# Patient Record
Sex: FEMALE | Race: WHITE | NOT HISPANIC OR LATINO | Employment: OTHER | URBAN - METROPOLITAN AREA
[De-identification: names, ages, dates, MRNs, and addresses within clinical notes are randomized per-mention and may not be internally consistent; named-entity substitution may affect disease eponyms.]

---

## 2019-04-24 PROBLEM — R60.0 LOCALIZED EDEMA: Status: ACTIVE | Noted: 2019-04-24

## 2019-04-24 PROBLEM — E66.812 CLASS 2 OBESITY WITHOUT SERIOUS COMORBIDITY WITH BODY MASS INDEX (BMI) OF 36.0 TO 36.9 IN ADULT: Status: ACTIVE | Noted: 2019-04-24

## 2019-04-24 PROBLEM — E66.9 CLASS 2 OBESITY WITHOUT SERIOUS COMORBIDITY WITH BODY MASS INDEX (BMI) OF 36.0 TO 36.9 IN ADULT: Status: ACTIVE | Noted: 2019-04-24

## 2019-04-24 PROBLEM — F17.210 DEPENDENCE ON NICOTINE FROM CIGARETTES: Status: ACTIVE | Noted: 2019-04-24

## 2019-04-24 PROBLEM — H10.13 ALLERGIC CONJUNCTIVITIS OF BOTH EYES: Status: ACTIVE | Noted: 2019-04-24

## 2020-06-02 PROBLEM — F17.210 DEPENDENCE ON NICOTINE FROM CIGARETTES: Status: RESOLVED | Noted: 2019-04-24 | Resolved: 2020-06-02

## 2021-07-16 PROBLEM — Z91.89 FRAMINGHAM CARDIAC RISK 10-20% IN NEXT 10 YEARS: Status: ACTIVE | Noted: 2021-07-16

## 2021-07-16 PROBLEM — Z12.31 ENCOUNTER FOR SCREENING MAMMOGRAM FOR MALIGNANT NEOPLASM OF BREAST: Status: ACTIVE | Noted: 2021-07-16

## 2021-07-16 PROBLEM — E66.01 CLASS 2 SEVERE OBESITY DUE TO EXCESS CALORIES WITH SERIOUS COMORBIDITY AND BODY MASS INDEX (BMI) OF 36.0 TO 36.9 IN ADULT: Status: ACTIVE | Noted: 2019-04-24

## 2021-09-27 ENCOUNTER — IMMUNIZATION (OUTPATIENT)
Dept: FAMILY MEDICINE | Facility: CLINIC | Age: 76
End: 2021-09-27

## 2021-09-27 DIAGNOSIS — Z23 NEED FOR VACCINATION: Primary | ICD-10-CM

## 2021-09-27 PROCEDURE — 0003A COVID-19, MRNA, LNP-S, PF, 30 MCG/0.3 ML DOSE VACCINE: CPT | Mod: PBBFAC,PO

## 2021-09-27 PROCEDURE — 91300 COVID-19, MRNA, LNP-S, PF, 30 MCG/0.3 ML DOSE VACCINE: CPT | Mod: PBBFAC,PO

## 2022-11-11 PROBLEM — M72.2 PLANTAR FASCIITIS: Status: ACTIVE | Noted: 2022-11-11

## 2022-11-11 PROBLEM — E78.2 MIXED HYPERLIPIDEMIA: Status: ACTIVE | Noted: 2022-11-11

## 2023-05-08 ENCOUNTER — TELEPHONE (OUTPATIENT)
Dept: PODIATRY | Facility: CLINIC | Age: 78
End: 2023-05-08
Payer: MEDICARE

## 2023-05-08 NOTE — TELEPHONE ENCOUNTER
----- Message from Amos Del Castillo sent at 5/8/2023  1:25 PM CDT -----  Contact: juan [phone]  Type: Needs Medical Advice  Who Called:  Juan Sandoval Call Back Number: [phone] or [phone]    Additional Information: Juan is calling the office trying to see why pt appt was cancelled. Please call back and advise.   URGENT

## 2023-05-11 ENCOUNTER — TELEPHONE (OUTPATIENT)
Dept: PODIATRY | Facility: CLINIC | Age: 78
End: 2023-05-11
Payer: MEDICARE

## 2023-05-11 NOTE — TELEPHONE ENCOUNTER
Spoke with patient and rescheduled appointment due to emergent surgery. She voiced understanding about rescheduling and new appointment time and date.

## 2023-05-16 ENCOUNTER — OFFICE VISIT (OUTPATIENT)
Dept: PODIATRY | Facility: CLINIC | Age: 78
End: 2023-05-16
Payer: MEDICARE

## 2023-05-16 VITALS — HEIGHT: 64 IN | BODY MASS INDEX: 36.05 KG/M2 | WEIGHT: 211.19 LBS

## 2023-05-16 DIAGNOSIS — L84 CORN OR CALLUS: ICD-10-CM

## 2023-05-16 DIAGNOSIS — M79.672 FOOT PAIN, BILATERAL: Primary | ICD-10-CM

## 2023-05-16 DIAGNOSIS — M20.41 HAMMER TOES OF BOTH FEET: ICD-10-CM

## 2023-05-16 DIAGNOSIS — Q82.8 POROKERATOSIS: ICD-10-CM

## 2023-05-16 DIAGNOSIS — M79.671 FOOT PAIN, BILATERAL: Primary | ICD-10-CM

## 2023-05-16 DIAGNOSIS — M20.42 HAMMER TOES OF BOTH FEET: ICD-10-CM

## 2023-05-16 PROCEDURE — 1125F PR PAIN SEVERITY QUANTIFIED, PAIN PRESENT: ICD-10-PCS | Mod: CPTII,,, | Performed by: PODIATRIST

## 2023-05-16 PROCEDURE — 1159F PR MEDICATION LIST DOCUMENTED IN MEDICAL RECORD: ICD-10-PCS | Mod: CPTII,,, | Performed by: PODIATRIST

## 2023-05-16 PROCEDURE — 99999 PR PBB SHADOW E&M-EST. PATIENT-LVL II: CPT | Mod: PBBFAC,,, | Performed by: PODIATRIST

## 2023-05-16 PROCEDURE — 99999 PR PBB SHADOW E&M-EST. PATIENT-LVL II: ICD-10-PCS | Mod: PBBFAC,,, | Performed by: PODIATRIST

## 2023-05-16 PROCEDURE — 1159F MED LIST DOCD IN RCRD: CPT | Mod: CPTII,,, | Performed by: PODIATRIST

## 2023-05-16 PROCEDURE — 1101F PR PT FALLS ASSESS DOC 0-1 FALLS W/OUT INJ PAST YR: ICD-10-PCS | Mod: CPTII,,, | Performed by: PODIATRIST

## 2023-05-16 PROCEDURE — 99203 OFFICE O/P NEW LOW 30 MIN: CPT | Mod: ,,, | Performed by: PODIATRIST

## 2023-05-16 PROCEDURE — 3288F PR FALLS RISK ASSESSMENT DOCUMENTED: ICD-10-PCS | Mod: CPTII,,, | Performed by: PODIATRIST

## 2023-05-16 PROCEDURE — 3288F FALL RISK ASSESSMENT DOCD: CPT | Mod: CPTII,,, | Performed by: PODIATRIST

## 2023-05-16 PROCEDURE — 99203 PR OFFICE/OUTPT VISIT, NEW, LEVL III, 30-44 MIN: ICD-10-PCS | Mod: ,,, | Performed by: PODIATRIST

## 2023-05-16 PROCEDURE — 1125F AMNT PAIN NOTED PAIN PRSNT: CPT | Mod: CPTII,,, | Performed by: PODIATRIST

## 2023-05-16 PROCEDURE — 1101F PT FALLS ASSESS-DOCD LE1/YR: CPT | Mod: CPTII,,, | Performed by: PODIATRIST

## 2023-05-16 NOTE — PROGRESS NOTES
Subjective:     Patient ID: Maci Moses is a 77 y.o. female.    Chief Complaint: Callouses (Callous corn) and Foot Problem (inserts)    Maci is a 77 y.o. female who presents with the chief complaint of bilateral foot pain.  Describes sharp pain, 9/10, from the site of callus build up to bilateral foot.  States the lesions have been present for years.  The lesion to the Rt. Plantar heel is especially tender to touch.  She has attempted to wear custom orthotics to eliminate symptoms with no relief noted.  Denies recent trauma to the limbs.  Denies any additional pedal complaints.      No past medical history on file.    Past Surgical History:   Procedure Laterality Date    HYSTERECTOMY      partial @ age 35     NOSE SURGERY      WRIST SURGERY         No family history on file.    Social History     Socioeconomic History    Marital status:    Tobacco Use    Smoking status: Former     Types: Cigarettes     Quit date: 2/2/2020     Years since quitting: 3.2    Smokeless tobacco: Never   Substance and Sexual Activity    Alcohol use: No    Drug use: No    Sexual activity: Never       Current Outpatient Medications   Medication Sig Dispense Refill    aspirin 162.5 mg Cp24 ASPIRIN 81 MG TABS      furosemide (LASIX) 20 MG tablet TAKE 1 TABLET EVERY DAY 90 tablet 0    potassium chloride SA (K-DUR,KLOR-CON) 20 MEQ tablet Take 1 tablet (20 mEq total) by mouth once daily. 90 tablet 1    rosuvastatin (CRESTOR) 10 MG tablet Take 1 tablet (10 mg total) by mouth once daily. 90 tablet 3     No current facility-administered medications for this visit.       Review of patient's allergies indicates:  No Known Allergies     Review of Systems   Constitutional: Negative for chills and fever.   Cardiovascular:  Positive for leg swelling. Negative for claudication.   Skin:  Positive for suspicious lesions. Negative for color change and nail changes.   Musculoskeletal:  Negative for muscle cramps and muscle weakness.    Gastrointestinal:  Negative for nausea and vomiting.   Neurological:  Negative for numbness and paresthesias.      Objective:     Physical Exam  Constitutional:       Appearance: Normal appearance. She is not ill-appearing.   Cardiovascular:      Pulses:           Dorsalis pedis pulses are 2+ on the right side and 2+ on the left side.        Posterior tibial pulses are 2+ on the right side and 2+ on the left side.      Comments: CFT is < 3 seconds bilateral.  Pedal hair growth is present bilateral.  Moderate nonpitting lower extremity edema noted bilateral.  Toes are warm to touch bilateral.    Musculoskeletal:         General: Tenderness and deformity present. No signs of injury.      Right lower leg: No edema.      Left lower leg: No edema.      Comments: Muscle strength 5/5 in all muscle groups bilateral.  No tenderness nor crepitation with ROM of foot/ankle joints bilateral.  Semi-reducible contracture of toes 2-5 bilateral.  Pain with palpation to the lesion of the Lt. 3rd toe and Rt. 2nd toe.     Skin:     General: Skin is warm.      Capillary Refill: Capillary refill takes 2 to 3 seconds.      Findings: Lesion present. No bruising, erythema, laceration, petechiae, rash or wound.      Comments: Pedal skin has normal turgor, temperature, and texture bilateral.  Toenails x 10 appear normotrophic. Porokeratosis noted to the Rt. Plantar central heel.  Corn noted to the distal tip of the Lt. 3rd toe and Rt. 2nd toe.        Neurological:      General: No focal deficit present.      Mental Status: She is alert.      Sensory: No sensory deficit.      Motor: No weakness or atrophy.      Comments: Light touch is intact bilateral.           Assessment:      Encounter Diagnoses   Name Primary?    Foot pain, bilateral Yes    Porokeratosis     Corn or callus     Hammer toes of both feet      Plan:     Maci was seen today for callouses and foot problem.    Diagnoses and all orders for this visit:    Foot pain,  bilateral    Porokeratosis    Corn or callus    Hammer toes of both feet      I counseled the patient on her conditions, their implications and medical management.    With the patient's verbal consent, a sterile #15 scalpel was used to trim lesions x 3 down to smooth appearing skin without incident.  Patient tolerated this quite well.    Fitted and dispensed a crest pad to offload the hammertoes of bilateral foot.    Advise to ambulate only in the [place]oka shoes and with insoles to accommodate her flatfoot deformity.    Advised to apply Ebanel and an occlusive dressing to the porokeratosis of the Rt. Plantar heel once daily x 3 weeks.    RTC in 3 weeks for a follow up.    Lucien Diaz DPM

## 2023-06-08 ENCOUNTER — OFFICE VISIT (OUTPATIENT)
Dept: ORTHOPEDICS | Facility: CLINIC | Age: 78
End: 2023-06-08
Payer: MEDICARE

## 2023-06-08 ENCOUNTER — HOSPITAL ENCOUNTER (OUTPATIENT)
Dept: RADIOLOGY | Facility: HOSPITAL | Age: 78
Discharge: HOME | End: 2023-06-08
Attending: ORTHOPAEDIC SURGERY
Payer: MEDICARE

## 2023-06-08 DIAGNOSIS — M79.672 PAIN IN BOTH FEET: ICD-10-CM

## 2023-06-08 DIAGNOSIS — M79.671 PAIN IN BOTH FEET: Primary | ICD-10-CM

## 2023-06-08 DIAGNOSIS — M21.41 ACQUIRED PES PLANOVALGUS OF RIGHT FOOT: Primary | ICD-10-CM

## 2023-06-08 DIAGNOSIS — M79.671 PAIN IN BOTH FEET: ICD-10-CM

## 2023-06-08 DIAGNOSIS — M79.672 PAIN IN BOTH FEET: Primary | ICD-10-CM

## 2023-06-08 DIAGNOSIS — M25.871 SUBFIBULAR IMPINGEMENT OF RIGHT LOWER EXTREMITY: ICD-10-CM

## 2023-06-08 PROCEDURE — 1125F AMNT PAIN NOTED PAIN PRSNT: CPT | Mod: CPTII,S$GLB,, | Performed by: ORTHOPAEDIC SURGERY

## 2023-06-08 PROCEDURE — 1101F PR PT FALLS ASSESS DOC 0-1 FALLS W/OUT INJ PAST YR: ICD-10-PCS | Mod: CPTII,S$GLB,, | Performed by: ORTHOPAEDIC SURGERY

## 2023-06-08 PROCEDURE — 3288F PR FALLS RISK ASSESSMENT DOCUMENTED: ICD-10-PCS | Mod: CPTII,S$GLB,, | Performed by: ORTHOPAEDIC SURGERY

## 2023-06-08 PROCEDURE — 73610 XR ANKLE COMPLETE 3 VIEW BILATERAL: ICD-10-PCS | Mod: 26,50,, | Performed by: RADIOLOGY

## 2023-06-08 PROCEDURE — 73610 X-RAY EXAM OF ANKLE: CPT | Mod: 26,50,, | Performed by: RADIOLOGY

## 2023-06-08 PROCEDURE — 73610 X-RAY EXAM OF ANKLE: CPT | Mod: TC,50,PO

## 2023-06-08 PROCEDURE — 73630 X-RAY EXAM OF FOOT: CPT | Mod: 26,50,, | Performed by: RADIOLOGY

## 2023-06-08 PROCEDURE — 73630 XR FOOT COMPLETE 3 VIEW BILATERAL: ICD-10-PCS | Mod: 26,50,, | Performed by: RADIOLOGY

## 2023-06-08 PROCEDURE — 1159F PR MEDICATION LIST DOCUMENTED IN MEDICAL RECORD: ICD-10-PCS | Mod: CPTII,S$GLB,, | Performed by: ORTHOPAEDIC SURGERY

## 2023-06-08 PROCEDURE — 1159F MED LIST DOCD IN RCRD: CPT | Mod: CPTII,S$GLB,, | Performed by: ORTHOPAEDIC SURGERY

## 2023-06-08 PROCEDURE — 20605 SMALL JOINT ASPIRATION/INJECTION: R SUBTALAR: ICD-10-PCS | Mod: RT,S$GLB,, | Performed by: ORTHOPAEDIC SURGERY

## 2023-06-08 PROCEDURE — 99999 PR PBB SHADOW E&M-EST. PATIENT-LVL II: CPT | Mod: PBBFAC,,, | Performed by: ORTHOPAEDIC SURGERY

## 2023-06-08 PROCEDURE — 3288F FALL RISK ASSESSMENT DOCD: CPT | Mod: CPTII,S$GLB,, | Performed by: ORTHOPAEDIC SURGERY

## 2023-06-08 PROCEDURE — 99999 PR PBB SHADOW E&M-EST. PATIENT-LVL II: ICD-10-PCS | Mod: PBBFAC,,, | Performed by: ORTHOPAEDIC SURGERY

## 2023-06-08 PROCEDURE — 99203 OFFICE O/P NEW LOW 30 MIN: CPT | Mod: 25,S$GLB,, | Performed by: ORTHOPAEDIC SURGERY

## 2023-06-08 PROCEDURE — 99203 PR OFFICE/OUTPT VISIT, NEW, LEVL III, 30-44 MIN: ICD-10-PCS | Mod: 25,S$GLB,, | Performed by: ORTHOPAEDIC SURGERY

## 2023-06-08 PROCEDURE — 1125F PR PAIN SEVERITY QUANTIFIED, PAIN PRESENT: ICD-10-PCS | Mod: CPTII,S$GLB,, | Performed by: ORTHOPAEDIC SURGERY

## 2023-06-08 PROCEDURE — 1101F PT FALLS ASSESS-DOCD LE1/YR: CPT | Mod: CPTII,S$GLB,, | Performed by: ORTHOPAEDIC SURGERY

## 2023-06-08 PROCEDURE — 20605 DRAIN/INJ JOINT/BURSA W/O US: CPT | Mod: RT,S$GLB,, | Performed by: ORTHOPAEDIC SURGERY

## 2023-06-08 PROCEDURE — 73630 X-RAY EXAM OF FOOT: CPT | Mod: TC,50,PO

## 2023-06-08 RX ORDER — TRIAMCINOLONE ACETONIDE 40 MG/ML
20 INJECTION, SUSPENSION INTRA-ARTICULAR; INTRAMUSCULAR
Status: DISCONTINUED | OUTPATIENT
Start: 2023-06-08 | End: 2023-06-08 | Stop reason: HOSPADM

## 2023-06-08 RX ADMIN — TRIAMCINOLONE ACETONIDE 20 MG: 40 INJECTION, SUSPENSION INTRA-ARTICULAR; INTRAMUSCULAR at 01:06

## 2023-06-08 NOTE — PROGRESS NOTES
Status/Diagnosis: Bilateral PCFD with Right-sided subfibular impingement.  Date of Surgery: none  Date of Injury: none  Return visit: PRN  X-rays on Return: pending patient complaint    Chief Complaint: Right foot and ankle pain    Present History:  Maci Moses is a 77 y.o. female who presents today for new patient evaluation.  Recently seen by Dr. Diaz as a new patient on 05/16/2023.  Patient was being treated for right plantar heel callus formation and hammering of multiple lesser toes.    Presents today with complaints of right plantar heel callus formation and right lateral ankle/hindfoot pain.  Minimal pain at rest, increased with weight-bearing.    Patient has known chronic flatfoot deformity but reports this has been essentially asymptomatic until the last several months to years.  Denies any history of injury or other inciting event.  Takes over-the-counter p.o. Aleve as needed for pain with mild to moderate relief.  Does have custom orthotics which she reports actually causes burning sensation with wear over the course of the day.  Denies any significant past medical history.  She does smoke 0.5 pack per day for the last 50+ years.  Uses a rolling walker at baseline.  Presents today in a wheelchair for ambulation.      No past medical history on file.    Past Surgical History:   Procedure Laterality Date    HYSTERECTOMY      partial @ age 35     NOSE SURGERY      WRIST SURGERY         Current Outpatient Medications   Medication Sig    aspirin 162.5 mg Cp24 ASPIRIN 81 MG TABS    furosemide (LASIX) 20 MG tablet TAKE 1 TABLET EVERY DAY    potassium chloride SA (K-DUR,KLOR-CON) 20 MEQ tablet Take 1 tablet (20 mEq total) by mouth once daily.    rosuvastatin (CRESTOR) 10 MG tablet Take 1 tablet (10 mg total) by mouth once daily.     No current facility-administered medications for this visit.       Review of patient's allergies indicates:  No Known Allergies    No family history on file.    Social History      Socioeconomic History    Marital status:    Tobacco Use    Smoking status: Former     Types: Cigarettes     Quit date: 2/2/2020     Years since quitting: 3.3    Smokeless tobacco: Never   Substance and Sexual Activity    Alcohol use: No    Drug use: No    Sexual activity: Never       Physical exam:  There were no vitals filed for this visit.  There is no height or weight on file to calculate BMI.  General: In no apparent distress; well developed and well nourished.  HEENT: normocephalic; atraumatic.  Cardiovascular: regular rate.  Respiratory: no increased work of breathing.  Musculoskeletal:   Gait: unsteady; mild antalgic  Inspection:  Moderate bilateral flatfoot deformity with associated hindfoot valgus.  Mild forefoot abduction is present.  2+ pitting edema bilateral lower extremities.  Semi-rigid with attempted hindfoot correction passively.  Unable to perform double or single limb heel rise.  Patient localizes pain only to the right lateral ankle/hindfoot with tenderness on deep palpation of the sinus tarsi.  Little to no tenderness along the course of the posterior tibial tendon, anterior ankle joint line, etc.  Patient pointed out small area of callus along the plantar heel.  No deep extension noted.  No signs or symptoms of infection.  [place]old:  Decreased dorsiflexion but with negative Silfverskiold  Alignment:  Knee: neutral               Ankle: neutral              Hindfoot:  Valgus              Forefoot:  Mild abduction  Strength:              Dorsiflexion 5/5  Plantar flexion 5/5  Inversion 4/5  Eversion 5/5   Sensation:              Altered but present sensation on monofilament testing  ROM:              Ankle: Full and painless.              Subtalar: Painless inversion and eversion   Pulses: 2+ DP/PT pulses.                   Imaging Studies/Outside documentation:  I have ordered/reviewed/interpreted the following images/outside documentation:  1. Weight bearing 3-views of Bilateral  foot and ankle:  No acute bony abnormality noted.  Moderate decreased calcaneal pitch with some degree of talar declination.  Talonavicular uncoverage within normal limits on the left, slightly increased on the right.  Left > right hallux valgus.  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  Moderate hindfoot valgus as seen on AP and mortise ankle views.  Ankle mortise remains congruent. Diffuse osteopenic changes throughout the foot and ankle.        Assessment:  Maci Moses is a 77 y.o. female with Bilateral PCFD with Right-sided subfibular impingement.     Plan:   Clinical and radiographic findings were discussed.  Recommend conservative management to include diagnostic/therapeutic right sinus tarsi corticosteroid injection.  Patient tolerated this well.  See procedure note for details.  Also provided with an increased arch support insert handout for [employer].  May repeat injection every 3-6 months as needed.  Patient voiced understanding.  All questions were answered.  She will follow up with me on an as-needed basis.    This note was created using voice recognition software and may contain grammatical errors.

## 2023-06-08 NOTE — PROCEDURES
Small Joint Aspiration/Injection: R subtalar    Date/Time: 6/8/2023 1:30 PM  Performed by: Yair Vance MD  Authorized by: Yair Vance MD     Consent Done?:  Yes (Verbal)  Indications:  Arthritis  Site marked: the procedure site was marked    Timeout: prior to procedure the correct patient, procedure, and site was verified    Prep: patient was prepped and draped in usual sterile fashion      Local anesthesia used?: Yes    Anesthesia:  Local infiltration  Local anesthetic:  Lidocaine 1% without epinephrine and bupivacaine 0.25% without epinephrine  Location:  Foot  Site:  R subtalar (right sinus tarsi)  Ultrasonic guidance for needle placement?: No    Needle size:  25 G  Medications:  20 mg triamcinolone acetonide 40 mg/mL  Patient tolerance:  Patient tolerated the procedure well with no immediate complications

## 2023-06-30 ENCOUNTER — OFFICE VISIT (OUTPATIENT)
Dept: PODIATRY | Facility: CLINIC | Age: 78
End: 2023-06-30
Payer: MEDICARE

## 2023-06-30 VITALS — WEIGHT: 211 LBS | BODY MASS INDEX: 36.02 KG/M2 | HEIGHT: 64 IN

## 2023-06-30 DIAGNOSIS — Q82.8 POROKERATOSIS: Primary | ICD-10-CM

## 2023-06-30 DIAGNOSIS — L84 CORN OR CALLUS: ICD-10-CM

## 2023-06-30 DIAGNOSIS — M20.42 HAMMER TOES OF BOTH FEET: ICD-10-CM

## 2023-06-30 DIAGNOSIS — M20.41 HAMMER TOES OF BOTH FEET: ICD-10-CM

## 2023-06-30 PROCEDURE — 3288F PR FALLS RISK ASSESSMENT DOCUMENTED: ICD-10-PCS | Mod: CPTII,S$GLB,, | Performed by: PODIATRIST

## 2023-06-30 PROCEDURE — 99212 OFFICE O/P EST SF 10 MIN: CPT | Mod: S$GLB,,, | Performed by: PODIATRIST

## 2023-06-30 PROCEDURE — 3288F FALL RISK ASSESSMENT DOCD: CPT | Mod: CPTII,S$GLB,, | Performed by: PODIATRIST

## 2023-06-30 PROCEDURE — 1159F PR MEDICATION LIST DOCUMENTED IN MEDICAL RECORD: ICD-10-PCS | Mod: CPTII,S$GLB,, | Performed by: PODIATRIST

## 2023-06-30 PROCEDURE — 99999 PR PBB SHADOW E&M-EST. PATIENT-LVL III: CPT | Mod: PBBFAC,,, | Performed by: PODIATRIST

## 2023-06-30 PROCEDURE — 1101F PR PT FALLS ASSESS DOC 0-1 FALLS W/OUT INJ PAST YR: ICD-10-PCS | Mod: CPTII,S$GLB,, | Performed by: PODIATRIST

## 2023-06-30 PROCEDURE — 1125F AMNT PAIN NOTED PAIN PRSNT: CPT | Mod: CPTII,S$GLB,, | Performed by: PODIATRIST

## 2023-06-30 PROCEDURE — 99212 PR OFFICE/OUTPT VISIT, EST, LEVL II, 10-19 MIN: ICD-10-PCS | Mod: S$GLB,,, | Performed by: PODIATRIST

## 2023-06-30 PROCEDURE — 1125F PR PAIN SEVERITY QUANTIFIED, PAIN PRESENT: ICD-10-PCS | Mod: CPTII,S$GLB,, | Performed by: PODIATRIST

## 2023-06-30 PROCEDURE — 1101F PT FALLS ASSESS-DOCD LE1/YR: CPT | Mod: CPTII,S$GLB,, | Performed by: PODIATRIST

## 2023-06-30 PROCEDURE — 99999 PR PBB SHADOW E&M-EST. PATIENT-LVL III: ICD-10-PCS | Mod: PBBFAC,,, | Performed by: PODIATRIST

## 2023-06-30 PROCEDURE — 1159F MED LIST DOCD IN RCRD: CPT | Mod: CPTII,S$GLB,, | Performed by: PODIATRIST

## 2023-07-05 ENCOUNTER — PATIENT MESSAGE (OUTPATIENT)
Dept: PODIATRY | Facility: CLINIC | Age: 78
End: 2023-07-05
Payer: MEDICARE

## 2023-07-28 ENCOUNTER — OFFICE VISIT (OUTPATIENT)
Dept: PODIATRY | Facility: CLINIC | Age: 78
End: 2023-07-28
Payer: MEDICARE

## 2023-07-28 VITALS — WEIGHT: 211 LBS | BODY MASS INDEX: 36.02 KG/M2 | HEIGHT: 64 IN

## 2023-07-28 DIAGNOSIS — Q82.8 POROKERATOSIS: Primary | ICD-10-CM

## 2023-07-28 DIAGNOSIS — M79.671 PAIN OF RIGHT HEEL: ICD-10-CM

## 2023-07-28 PROCEDURE — 99999 PR PBB SHADOW E&M-EST. PATIENT-LVL III: CPT | Mod: PBBFAC,,, | Performed by: PODIATRIST

## 2023-07-28 PROCEDURE — 99212 OFFICE O/P EST SF 10 MIN: CPT | Mod: S$GLB,,, | Performed by: PODIATRIST

## 2023-07-28 PROCEDURE — 1125F AMNT PAIN NOTED PAIN PRSNT: CPT | Mod: CPTII,S$GLB,, | Performed by: PODIATRIST

## 2023-07-28 PROCEDURE — 1159F PR MEDICATION LIST DOCUMENTED IN MEDICAL RECORD: ICD-10-PCS | Mod: CPTII,S$GLB,, | Performed by: PODIATRIST

## 2023-07-28 PROCEDURE — 99999 PR PBB SHADOW E&M-EST. PATIENT-LVL III: ICD-10-PCS | Mod: PBBFAC,,, | Performed by: PODIATRIST

## 2023-07-28 PROCEDURE — 1101F PT FALLS ASSESS-DOCD LE1/YR: CPT | Mod: CPTII,S$GLB,, | Performed by: PODIATRIST

## 2023-07-28 PROCEDURE — 1159F MED LIST DOCD IN RCRD: CPT | Mod: CPTII,S$GLB,, | Performed by: PODIATRIST

## 2023-07-28 PROCEDURE — 3288F FALL RISK ASSESSMENT DOCD: CPT | Mod: CPTII,S$GLB,, | Performed by: PODIATRIST

## 2023-07-28 PROCEDURE — 1125F PR PAIN SEVERITY QUANTIFIED, PAIN PRESENT: ICD-10-PCS | Mod: CPTII,S$GLB,, | Performed by: PODIATRIST

## 2023-07-28 PROCEDURE — 99212 PR OFFICE/OUTPT VISIT, EST, LEVL II, 10-19 MIN: ICD-10-PCS | Mod: S$GLB,,, | Performed by: PODIATRIST

## 2023-07-28 PROCEDURE — 1101F PR PT FALLS ASSESS DOC 0-1 FALLS W/OUT INJ PAST YR: ICD-10-PCS | Mod: CPTII,S$GLB,, | Performed by: PODIATRIST

## 2023-07-28 PROCEDURE — 3288F PR FALLS RISK ASSESSMENT DOCUMENTED: ICD-10-PCS | Mod: CPTII,S$GLB,, | Performed by: PODIATRIST

## 2023-07-29 NOTE — PROGRESS NOTES
Subjective:     Patient ID: Maci Moses is a 77 y.o. female.    Chief Complaint: Callouses    Patient presents to clinic for a follow up regarding the painful callus of the Rt. Plantar heel.  Describes experiencing sharp pain from the lesion with weight bearing.  Rates pain as a 9/10.  She has been wearing the custom molded orthotics in supportive shoe gear with no improvement noted.  She also continues applying lotion to the affected area each day.  Inquires as to how this can finally be resolved.  Denies any additional pedal complaints.      No past medical history on file.    Past Surgical History:   Procedure Laterality Date    HYSTERECTOMY      partial @ age 35     NOSE SURGERY      WRIST SURGERY         No family history on file.    Social History     Socioeconomic History    Marital status:    Tobacco Use    Smoking status: Former     Current packs/day: 0.00     Types: Cigarettes     Quit date: 2/2/2020     Years since quitting: 3.4    Smokeless tobacco: Never   Substance and Sexual Activity    Alcohol use: No    Drug use: No    Sexual activity: Never       Current Outpatient Medications   Medication Sig Dispense Refill    aspirin 162.5 mg Cp24 ASPIRIN 81 MG TABS      furosemide (LASIX) 20 MG tablet TAKE 1 TABLET EVERY DAY 90 tablet 0    potassium chloride SA (K-DUR,KLOR-CON) 20 MEQ tablet TAKE 1 TABLET (20 MEQ TOTAL) BY MOUTH ONCE DAILY. 90 tablet 1    rosuvastatin (CRESTOR) 10 MG tablet Take 1 tablet (10 mg total) by mouth once daily. 90 tablet 3     No current facility-administered medications for this visit.       Review of patient's allergies indicates:  No Known Allergies     Review of Systems   Constitutional: Negative for chills and fever.   Cardiovascular:  Positive for leg swelling. Negative for claudication.   Skin:  Positive for suspicious lesions. Negative for color change and nail changes.   Musculoskeletal:  Negative for muscle cramps and muscle weakness.   Gastrointestinal:  Negative  for nausea and vomiting.   Neurological:  Negative for numbness and paresthesias.        Objective:     Physical Exam  Constitutional:       Appearance: Normal appearance. She is not ill-appearing.   Cardiovascular:      Pulses:           Dorsalis pedis pulses are 2+ on the right side and 2+ on the left side.        Posterior tibial pulses are 2+ on the right side and 2+ on the left side.      Comments: CFT is < 3 seconds bilateral.  Pedal hair growth is present bilateral.  Moderate nonpitting lower extremity edema noted bilateral.  Toes are warm to touch bilateral.    Musculoskeletal:         General: Tenderness and deformity present. No signs of injury.      Right lower leg: No edema.      Left lower leg: No edema.      Comments: Muscle strength 5/5 in all muscle groups bilateral.  No tenderness nor crepitation with ROM of foot/ankle joints bilateral.  Semi-reducible contracture of toes 2-5 bilateral.  Pain with palpation to the Rt. Heel porokeratosis   Skin:     General: Skin is warm.      Capillary Refill: Capillary refill takes 2 to 3 seconds.      Findings: Lesion present. No bruising, erythema, laceration, petechiae, rash or wound.      Comments: Pedal skin has normal turgor, temperature, and texture bilateral.  Toenails x 10 appear normotrophic. Porokeratosis noted to the Rt. Plantar central heel.  Minimal hyperkeratotic build up noted to the distal tip of the Lt. 3rd toe and Rt. 2nd toe.        Neurological:      General: No focal deficit present.      Mental Status: She is alert.      Sensory: No sensory deficit.      Motor: No weakness or atrophy.      Comments: Light touch is intact bilateral.             Assessment:      Encounter Diagnoses   Name Primary?    Porokeratosis Yes    Pain of right heel        Plan:     Maci was seen today for [facility].    Diagnoses and all orders for this visit:    Porokeratosis    Pain of right heel  -     HME - OTHER        I counseled the patient on her conditions,  their implications and medical management.      With the patient's verbal consent, a sterile #15 scalpel was used to trim lesion x 1 down to smooth appearing skin without incident.  Patient tolerated this quite well.    To continue utilizing crest pads to offload the hammertoes of bilateral foot.  This has significantly improved since the last exam.      Advise to ambulate only in the [place]oka shoes.    Addendum written to the previous orthotic order.  Will have a polyurethane/silicone layer added to the heel of the Rt. Orthotic.    Advised to apply Ebanel and an occlusive dressing to the porokeratosis of the Rt. Plantar heel once daily x 1 month.    RTC prn.    Lucien Diaz DPM

## 2023-08-25 ENCOUNTER — OFFICE VISIT (OUTPATIENT)
Dept: PODIATRY | Facility: CLINIC | Age: 78
End: 2023-08-25
Payer: MEDICARE

## 2023-08-25 VITALS — BODY MASS INDEX: 35.85 KG/M2 | WEIGHT: 210 LBS | HEIGHT: 64 IN

## 2023-08-25 DIAGNOSIS — M79.671 PAIN OF RIGHT HEEL: ICD-10-CM

## 2023-08-25 DIAGNOSIS — Q82.8 POROKERATOSIS: Primary | ICD-10-CM

## 2023-08-25 PROCEDURE — 1159F MED LIST DOCD IN RCRD: CPT | Mod: CPTII,S$GLB,, | Performed by: PODIATRIST

## 2023-08-25 PROCEDURE — 1125F AMNT PAIN NOTED PAIN PRSNT: CPT | Mod: CPTII,S$GLB,, | Performed by: PODIATRIST

## 2023-08-25 PROCEDURE — 1101F PR PT FALLS ASSESS DOC 0-1 FALLS W/OUT INJ PAST YR: ICD-10-PCS | Mod: CPTII,S$GLB,, | Performed by: PODIATRIST

## 2023-08-25 PROCEDURE — 1125F PR PAIN SEVERITY QUANTIFIED, PAIN PRESENT: ICD-10-PCS | Mod: CPTII,S$GLB,, | Performed by: PODIATRIST

## 2023-08-25 PROCEDURE — 3288F FALL RISK ASSESSMENT DOCD: CPT | Mod: CPTII,S$GLB,, | Performed by: PODIATRIST

## 2023-08-25 PROCEDURE — 99999 PR PBB SHADOW E&M-EST. PATIENT-LVL III: CPT | Mod: PBBFAC,,, | Performed by: PODIATRIST

## 2023-08-25 PROCEDURE — 3288F PR FALLS RISK ASSESSMENT DOCUMENTED: ICD-10-PCS | Mod: CPTII,S$GLB,, | Performed by: PODIATRIST

## 2023-08-25 PROCEDURE — 99999 PR PBB SHADOW E&M-EST. PATIENT-LVL III: ICD-10-PCS | Mod: PBBFAC,,, | Performed by: PODIATRIST

## 2023-08-25 PROCEDURE — 99212 OFFICE O/P EST SF 10 MIN: CPT | Mod: S$GLB,,, | Performed by: PODIATRIST

## 2023-08-25 PROCEDURE — 1101F PT FALLS ASSESS-DOCD LE1/YR: CPT | Mod: CPTII,S$GLB,, | Performed by: PODIATRIST

## 2023-08-25 PROCEDURE — 99212 PR OFFICE/OUTPT VISIT, EST, LEVL II, 10-19 MIN: ICD-10-PCS | Mod: S$GLB,,, | Performed by: PODIATRIST

## 2023-08-25 PROCEDURE — 1159F PR MEDICATION LIST DOCUMENTED IN MEDICAL RECORD: ICD-10-PCS | Mod: CPTII,S$GLB,, | Performed by: PODIATRIST

## 2023-08-25 RX ORDER — CEPHALEXIN 500 MG/1
CAPSULE ORAL
COMMUNITY
End: 2024-01-23

## 2023-08-25 RX ORDER — ACETAMINOPHEN AND CODEINE PHOSPHATE 300; 30 MG/1; MG/1
1 TABLET ORAL EVERY 6 HOURS
COMMUNITY
End: 2024-02-02

## 2023-08-25 RX ORDER — AMOXICILLIN AND CLAVULANATE POTASSIUM 500; 125 MG/1; MG/1
TABLET, FILM COATED ORAL
COMMUNITY
End: 2024-01-23

## 2023-08-25 RX ORDER — ROSUVASTATIN CALCIUM 10 MG/1
TABLET, COATED ORAL
COMMUNITY
End: 2024-01-23 | Stop reason: SDUPTHER

## 2023-08-25 RX ORDER — TRAMADOL HYDROCHLORIDE 50 MG/1
TABLET ORAL
COMMUNITY
End: 2024-01-23 | Stop reason: CLARIF

## 2023-08-25 NOTE — PROGRESS NOTES
Subjective:     Patient ID: Maci Moses is a 78 y.o. female.    Chief Complaint: Plantar Warts    Patient presents to clinic with 8/10 pain from the usual site of callus build up to the Rt. Plantar heel.  Notes having the orthotics modified, with more cushion applied to the heel.  States this has been minimally helpful in curbing pain symptoms.  Notes continued application of lotion to the affected area each day.  Denies any additional pedal complaints.      No past medical history on file.    Past Surgical History:   Procedure Laterality Date    HYSTERECTOMY      partial @ age 35     NOSE SURGERY      WRIST SURGERY         No family history on file.    Social History     Socioeconomic History    Marital status:    Tobacco Use    Smoking status: Former     Current packs/day: 0.00     Types: Cigarettes     Quit date: 2/2/2020     Years since quitting: 3.5    Smokeless tobacco: Never   Substance and Sexual Activity    Alcohol use: No    Drug use: No    Sexual activity: Never       Current Outpatient Medications   Medication Sig Dispense Refill    acetaminophen-codeine 300-30mg (TYLENOL #3) 300-30 mg Tab Take 1 tablet every 6 hours by oral route.      amoxicillin-clavulanate 500-125mg (AUGMENTIN) 500-125 mg Tab Take 1 tablet every 12 hours by oral route for 7 days.      aspirin 162.5 mg Cp24 ASPIRIN 81 MG TABS      cephALEXin (KEFLEX) 500 MG capsule Take 1 capsule 3 times a day by oral route for 7 days.      furosemide (LASIX) 20 MG tablet TAKE 1 TABLET EVERY DAY 90 tablet 0    potassium chloride SA (K-DUR,KLOR-CON) 20 MEQ tablet TAKE 1 TABLET (20 MEQ TOTAL) BY MOUTH ONCE DAILY. 90 tablet 1    rosuvastatin (CRESTOR) 10 MG tablet       traMADoL (ULTRAM) 50 mg tablet Take 1 tablet every 12 hours by oral route for 5 days.       No current facility-administered medications for this visit.       Review of patient's allergies indicates:  No Known Allergies     Review of Systems   Constitutional: Negative for chills  and fever.   Cardiovascular:  Positive for leg swelling. Negative for claudication.   Skin:  Positive for suspicious lesions. Negative for color change and nail changes.   Musculoskeletal:  Negative for muscle cramps and muscle weakness.   Gastrointestinal:  Negative for nausea and vomiting.   Neurological:  Negative for numbness and paresthesias.        Objective:     Physical Exam  Constitutional:       Appearance: Normal appearance. She is not ill-appearing.   Cardiovascular:      Pulses:           Dorsalis pedis pulses are 2+ on the right side and 2+ on the left side.        Posterior tibial pulses are 2+ on the right side and 2+ on the left side.      Comments: CFT is < 3 seconds bilateral.  Pedal hair growth is present bilateral.  Moderate nonpitting lower extremity edema noted bilateral.  Toes are warm to touch bilateral.    Musculoskeletal:         General: Tenderness and deformity present. No signs of injury.      Right lower leg: No edema.      Left lower leg: No edema.      Comments: Muscle strength 5/5 in all muscle groups bilateral.  No tenderness nor crepitation with ROM of foot/ankle joints bilateral.  Semi-reducible contracture of toes 2-5 bilateral.  Pain with palpation to the Rt. Heel porokeratosis   Skin:     General: Skin is warm.      Capillary Refill: Capillary refill takes 2 to 3 seconds.      Findings: Lesion present. No bruising, erythema, laceration, petechiae, rash or wound.      Comments: Pedal skin has normal turgor, temperature, and texture bilateral.  Toenails x 10 appear normotrophic. Porokeratosis noted to the Rt. Plantar central heel.       Neurological:      General: No focal deficit present.      Mental Status: She is alert.      Sensory: No sensory deficit.      Motor: No weakness or atrophy.      Comments: Light touch is intact bilateral.             Assessment:      Encounter Diagnoses   Name Primary?    Porokeratosis Yes    Pain of right heel        Plan:     Maci was seen  today for plantar warts.    Diagnoses and all orders for this visit:    Porokeratosis    Pain of right heel        I counseled the patient on her conditions, their implications and medical management.      With the patient's verbal consent, a sterile #15 scalpel was used to trim lesion x 1 down to smooth appearing skin without incident.  Patient tolerated this quite well.    Advise to ambulate only in the [place]oka shoes.    Advised to apply Ebanel and an occlusive dressing to the porokeratosis of the Rt. Plantar heel once daily x 1 month.    RTC in 4 weeks for maintenance.      Lucien Diaz DPM

## 2023-09-22 ENCOUNTER — OFFICE VISIT (OUTPATIENT)
Dept: PODIATRY | Facility: CLINIC | Age: 78
End: 2023-09-22
Payer: MEDICARE

## 2023-09-22 VITALS — WEIGHT: 210 LBS | BODY MASS INDEX: 35.85 KG/M2 | HEIGHT: 64 IN

## 2023-09-22 DIAGNOSIS — Q82.8 POROKERATOSIS: ICD-10-CM

## 2023-09-22 DIAGNOSIS — M79.671 PAIN OF RIGHT HEEL: Primary | ICD-10-CM

## 2023-09-22 PROCEDURE — 1159F PR MEDICATION LIST DOCUMENTED IN MEDICAL RECORD: ICD-10-PCS | Mod: CPTII,S$GLB,, | Performed by: PODIATRIST

## 2023-09-22 PROCEDURE — 1101F PR PT FALLS ASSESS DOC 0-1 FALLS W/OUT INJ PAST YR: ICD-10-PCS | Mod: CPTII,S$GLB,, | Performed by: PODIATRIST

## 2023-09-22 PROCEDURE — 99999 PR PBB SHADOW E&M-EST. PATIENT-LVL III: ICD-10-PCS | Mod: PBBFAC,,, | Performed by: PODIATRIST

## 2023-09-22 PROCEDURE — 99999 PR PBB SHADOW E&M-EST. PATIENT-LVL III: CPT | Mod: PBBFAC,,, | Performed by: PODIATRIST

## 2023-09-22 PROCEDURE — 1125F AMNT PAIN NOTED PAIN PRSNT: CPT | Mod: CPTII,S$GLB,, | Performed by: PODIATRIST

## 2023-09-22 PROCEDURE — 99212 OFFICE O/P EST SF 10 MIN: CPT | Mod: S$GLB,,, | Performed by: PODIATRIST

## 2023-09-22 PROCEDURE — 1125F PR PAIN SEVERITY QUANTIFIED, PAIN PRESENT: ICD-10-PCS | Mod: CPTII,S$GLB,, | Performed by: PODIATRIST

## 2023-09-22 PROCEDURE — 3288F FALL RISK ASSESSMENT DOCD: CPT | Mod: CPTII,S$GLB,, | Performed by: PODIATRIST

## 2023-09-22 PROCEDURE — 3288F PR FALLS RISK ASSESSMENT DOCUMENTED: ICD-10-PCS | Mod: CPTII,S$GLB,, | Performed by: PODIATRIST

## 2023-09-22 PROCEDURE — 1159F MED LIST DOCD IN RCRD: CPT | Mod: CPTII,S$GLB,, | Performed by: PODIATRIST

## 2023-09-22 PROCEDURE — 1101F PT FALLS ASSESS-DOCD LE1/YR: CPT | Mod: CPTII,S$GLB,, | Performed by: PODIATRIST

## 2023-09-22 PROCEDURE — 99212 PR OFFICE/OUTPT VISIT, EST, LEVL II, 10-19 MIN: ICD-10-PCS | Mod: S$GLB,,, | Performed by: PODIATRIST

## 2023-09-23 NOTE — PROGRESS NOTES
Subjective:     Patient ID: Maci Moses is a 78 y.o. female.    Chief Complaint: Callouses    Patient presents to clinic for a follow up regarding the Rt. Plantar heel pain.  Describes pain as sharp and rates as a 10/10 pain from the usual site of callus build up.  States symptoms have been aggravated with all weight bearing and alleviated with rest only.  She has been applying ebanel and a band aid, however, states the bandage adhesive is irritating the skin and has since discontinued said treatment.  Requests to have the lesion trimmed. Denies any additional pedal complaints.      No past medical history on file.    Past Surgical History:   Procedure Laterality Date    HYSTERECTOMY      partial @ age 35     NOSE SURGERY      WRIST SURGERY         No family history on file.    Social History     Socioeconomic History    Marital status:    Tobacco Use    Smoking status: Former     Current packs/day: 0.00     Types: Cigarettes     Quit date: 2/2/2020     Years since quitting: 3.6    Smokeless tobacco: Never   Substance and Sexual Activity    Alcohol use: No    Drug use: No    Sexual activity: Never       Current Outpatient Medications   Medication Sig Dispense Refill    acetaminophen-codeine 300-30mg (TYLENOL #3) 300-30 mg Tab Take 1 tablet every 6 hours by oral route.      amoxicillin-clavulanate 500-125mg (AUGMENTIN) 500-125 mg Tab Take 1 tablet every 12 hours by oral route for 7 days.      aspirin 162.5 mg Cp24 ASPIRIN 81 MG TABS      cephALEXin (KEFLEX) 500 MG capsule Take 1 capsule 3 times a day by oral route for 7 days.      furosemide (LASIX) 20 MG tablet TAKE 1 TABLET EVERY DAY 90 tablet 0    potassium chloride SA (K-DUR,KLOR-CON) 20 MEQ tablet TAKE 1 TABLET (20 MEQ TOTAL) BY MOUTH ONCE DAILY. 90 tablet 1    rosuvastatin (CRESTOR) 10 MG tablet       traMADoL (ULTRAM) 50 mg tablet Take 1 tablet every 12 hours by oral route for 5 days.       No current facility-administered medications for this visit.        Review of patient's allergies indicates:  No Known Allergies     Review of Systems   Constitutional: Negative for chills and fever.   Cardiovascular:  Positive for leg swelling. Negative for claudication.   Skin:  Positive for suspicious lesions. Negative for color change and nail changes.   Musculoskeletal:  Negative for muscle cramps and muscle weakness.   Gastrointestinal:  Negative for nausea and vomiting.   Neurological:  Negative for numbness and paresthesias.        Objective:     Physical Exam  Constitutional:       Appearance: Normal appearance. She is not ill-appearing.   Cardiovascular:      Pulses:           Dorsalis pedis pulses are 2+ on the right side and 2+ on the left side.        Posterior tibial pulses are 2+ on the right side and 2+ on the left side.      Comments: CFT is < 3 seconds bilateral.  Pedal hair growth is present bilateral.  Moderate nonpitting lower extremity edema noted bilateral.  Toes are warm to touch bilateral.    Musculoskeletal:         General: Tenderness and deformity present. No signs of injury.      Right lower leg: No edema.      Left lower leg: No edema.      Comments: Muscle strength 5/5 in all muscle groups bilateral.  No tenderness nor crepitation with ROM of foot/ankle joints bilateral.  Semi-reducible contracture of toes 2-5 bilateral.  Pain with palpation to the Rt. Heel porokeratosis   Skin:     General: Skin is warm.      Capillary Refill: Capillary refill takes 2 to 3 seconds.      Findings: Lesion present. No bruising, erythema, laceration, petechiae, rash or wound.      Comments: Pedal skin has normal turgor, temperature, and texture bilateral.  Toenails x 10 appear normotrophic. Porokeratosis noted to the Rt. Plantar central heel.       Neurological:      General: No focal deficit present.      Mental Status: She is alert.      Sensory: No sensory deficit.      Motor: No weakness or atrophy.      Comments: Light touch is intact bilateral.              Assessment:      Encounter Diagnoses   Name Primary?    Pain of right heel Yes    Porokeratosis        Plan:     Maci was seen today for [facility].    Diagnoses and all orders for this visit:    Pain of right heel    Porokeratosis        I counseled the patient on her conditions, their implications and medical management.      With the patient's verbal consent, a sterile #15 scalpel was used to trim lesion x 1 down to smooth appearing skin without incident.  Patient tolerated this quite well.    Advise to ambulate only in the [place] shoes.    To resume application of Ebanel and an occlusive dressing to the porokeratosis of the Rt. Plantar heel once daily x 1 month.    RTC in 4 weeks for maintenance.      Lucien Diaz DPM

## 2023-10-20 ENCOUNTER — OFFICE VISIT (OUTPATIENT)
Dept: PODIATRY | Facility: CLINIC | Age: 78
End: 2023-10-20
Payer: MEDICARE

## 2023-10-20 VITALS — WEIGHT: 210 LBS | HEIGHT: 64 IN | BODY MASS INDEX: 35.85 KG/M2

## 2023-10-20 DIAGNOSIS — M79.671 PAIN OF RIGHT HEEL: Primary | ICD-10-CM

## 2023-10-20 DIAGNOSIS — Q82.8 POROKERATOSIS: ICD-10-CM

## 2023-10-20 PROCEDURE — 99212 OFFICE O/P EST SF 10 MIN: CPT | Mod: S$GLB,,, | Performed by: PODIATRIST

## 2023-10-20 PROCEDURE — 1101F PT FALLS ASSESS-DOCD LE1/YR: CPT | Mod: CPTII,S$GLB,, | Performed by: PODIATRIST

## 2023-10-20 PROCEDURE — 1125F AMNT PAIN NOTED PAIN PRSNT: CPT | Mod: CPTII,S$GLB,, | Performed by: PODIATRIST

## 2023-10-20 PROCEDURE — 99999 PR PBB SHADOW E&M-EST. PATIENT-LVL I: ICD-10-PCS | Mod: PBBFAC,,, | Performed by: PODIATRIST

## 2023-10-20 PROCEDURE — 1125F PR PAIN SEVERITY QUANTIFIED, PAIN PRESENT: ICD-10-PCS | Mod: CPTII,S$GLB,, | Performed by: PODIATRIST

## 2023-10-20 PROCEDURE — 3288F FALL RISK ASSESSMENT DOCD: CPT | Mod: CPTII,S$GLB,, | Performed by: PODIATRIST

## 2023-10-20 PROCEDURE — 1101F PR PT FALLS ASSESS DOC 0-1 FALLS W/OUT INJ PAST YR: ICD-10-PCS | Mod: CPTII,S$GLB,, | Performed by: PODIATRIST

## 2023-10-20 PROCEDURE — 99212 PR OFFICE/OUTPT VISIT, EST, LEVL II, 10-19 MIN: ICD-10-PCS | Mod: S$GLB,,, | Performed by: PODIATRIST

## 2023-10-20 PROCEDURE — 99999 PR PBB SHADOW E&M-EST. PATIENT-LVL I: CPT | Mod: PBBFAC,,, | Performed by: PODIATRIST

## 2023-10-20 PROCEDURE — 3288F PR FALLS RISK ASSESSMENT DOCUMENTED: ICD-10-PCS | Mod: CPTII,S$GLB,, | Performed by: PODIATRIST

## 2023-10-24 ENCOUNTER — PATIENT MESSAGE (OUTPATIENT)
Dept: PODIATRY | Facility: CLINIC | Age: 78
End: 2023-10-24
Payer: MEDICARE

## 2023-11-16 ENCOUNTER — PATIENT MESSAGE (OUTPATIENT)
Dept: PODIATRY | Facility: CLINIC | Age: 78
End: 2023-11-16

## 2023-11-16 ENCOUNTER — PATIENT MESSAGE (OUTPATIENT)
Dept: PODIATRY | Facility: CLINIC | Age: 78
End: 2023-11-16
Payer: MEDICARE

## 2023-11-16 ENCOUNTER — OFFICE VISIT (OUTPATIENT)
Dept: PODIATRY | Facility: CLINIC | Age: 78
End: 2023-11-16
Payer: MEDICARE

## 2023-11-16 VITALS — WEIGHT: 210.13 LBS | HEIGHT: 64 IN | BODY MASS INDEX: 35.87 KG/M2

## 2023-11-16 DIAGNOSIS — M25.571 ACUTE RIGHT ANKLE PAIN: Primary | ICD-10-CM

## 2023-11-16 DIAGNOSIS — Q82.8 POROKERATOSIS: ICD-10-CM

## 2023-11-16 PROCEDURE — 99999 PR PBB SHADOW E&M-EST. PATIENT-LVL III: ICD-10-PCS | Mod: PBBFAC,,, | Performed by: PODIATRIST

## 2023-11-16 PROCEDURE — 99214 OFFICE O/P EST MOD 30 MIN: CPT | Mod: 25,S$GLB,, | Performed by: PODIATRIST

## 2023-11-16 PROCEDURE — 1125F PR PAIN SEVERITY QUANTIFIED, PAIN PRESENT: ICD-10-PCS | Mod: CPTII,S$GLB,, | Performed by: PODIATRIST

## 2023-11-16 PROCEDURE — 1125F AMNT PAIN NOTED PAIN PRSNT: CPT | Mod: CPTII,S$GLB,, | Performed by: PODIATRIST

## 2023-11-16 PROCEDURE — 1159F MED LIST DOCD IN RCRD: CPT | Mod: CPTII,S$GLB,, | Performed by: PODIATRIST

## 2023-11-16 PROCEDURE — 20600 PR DRAIN/INJECT SMALL JOINT/BURSA: ICD-10-PCS | Mod: RT,S$GLB,, | Performed by: PODIATRIST

## 2023-11-16 PROCEDURE — 99999 PR PBB SHADOW E&M-EST. PATIENT-LVL III: CPT | Mod: PBBFAC,,, | Performed by: PODIATRIST

## 2023-11-16 PROCEDURE — 99214 PR OFFICE/OUTPT VISIT, EST, LEVL IV, 30-39 MIN: ICD-10-PCS | Mod: 25,S$GLB,, | Performed by: PODIATRIST

## 2023-11-16 PROCEDURE — 1159F PR MEDICATION LIST DOCUMENTED IN MEDICAL RECORD: ICD-10-PCS | Mod: CPTII,S$GLB,, | Performed by: PODIATRIST

## 2023-11-16 PROCEDURE — 20600 DRAIN/INJ JOINT/BURSA W/O US: CPT | Mod: RT,S$GLB,, | Performed by: PODIATRIST

## 2023-11-16 RX ADMIN — TRIAMCINOLONE ACETONIDE 20 MG: 40 INJECTION, SUSPENSION INTRA-ARTICULAR; INTRAMUSCULAR at 12:11

## 2023-11-16 RX ADMIN — DEXAMETHASONE SODIUM PHOSPHATE 2 MG: 4 INJECTION, SOLUTION INTRA-ARTICULAR; INTRALESIONAL; INTRAMUSCULAR; INTRAVENOUS; SOFT TISSUE at 12:11

## 2023-11-16 RX ADMIN — LIDOCAINE HYDROCHLORIDE 1 ML: 10 INJECTION INFILTRATION; PERINEURAL at 12:11

## 2023-11-18 RX ORDER — TRIAMCINOLONE ACETONIDE 40 MG/ML
20 INJECTION, SUSPENSION INTRA-ARTICULAR; INTRAMUSCULAR ONCE
Status: COMPLETED | OUTPATIENT
Start: 2023-11-16 | End: 2023-11-16

## 2023-11-18 RX ORDER — LIDOCAINE HYDROCHLORIDE 10 MG/ML
1 INJECTION INFILTRATION; PERINEURAL
Status: COMPLETED | OUTPATIENT
Start: 2023-11-16 | End: 2023-11-16

## 2023-11-18 RX ORDER — DEXAMETHASONE SODIUM PHOSPHATE 4 MG/ML
2 INJECTION, SOLUTION INTRA-ARTICULAR; INTRALESIONAL; INTRAMUSCULAR; INTRAVENOUS; SOFT TISSUE
Status: COMPLETED | OUTPATIENT
Start: 2023-11-16 | End: 2023-11-16

## 2023-11-18 NOTE — PROGRESS NOTES
Subjective:     Patient ID: Maci Moses is a 78 y.o. female.    Chief Complaint: Foot Pain (Right heel pain)    Patient presents to clinic with the chief complaint of Rt. Ankle pain with all weight bearing.  Describes pain as deep aching and rates currently as a 9/10.  Symptoms are aggravated with all weight bearing and alleviated with rest.  Attempts to wear supportive shoes with some improvement noted.  Also, notes continued sensitivity from the lesion of the Rt. Plantar heel.  Continues applying moisturizer with an occlusive dressing daily.  Denies any additional pedal complaints.      No past medical history on file.    Past Surgical History:   Procedure Laterality Date    HYSTERECTOMY      partial @ age 35     NOSE SURGERY      WRIST SURGERY         No family history on file.    Social History     Socioeconomic History    Marital status:    Tobacco Use    Smoking status: Former     Current packs/day: 0.00     Types: Cigarettes     Quit date: 2/2/2020     Years since quitting: 3.7    Smokeless tobacco: Never   Substance and Sexual Activity    Alcohol use: No    Drug use: No    Sexual activity: Never       Current Outpatient Medications   Medication Sig Dispense Refill    acetaminophen-codeine 300-30mg (TYLENOL #3) 300-30 mg Tab Take 1 tablet every 6 hours by oral route.      amoxicillin-clavulanate 500-125mg (AUGMENTIN) 500-125 mg Tab Take 1 tablet every 12 hours by oral route for 7 days.      aspirin 162.5 mg Cp24 ASPIRIN 81 MG TABS      cephALEXin (KEFLEX) 500 MG capsule Take 1 capsule 3 times a day by oral route for 7 days.      furosemide (LASIX) 20 MG tablet TAKE 1 TABLET EVERY DAY 90 tablet 0    potassium chloride SA (K-DUR,KLOR-CON) 20 MEQ tablet TAKE 1 TABLET (20 MEQ TOTAL) BY MOUTH ONCE DAILY. 90 tablet 1    rosuvastatin (CRESTOR) 10 MG tablet       traMADoL (ULTRAM) 50 mg tablet Take 1 tablet every 12 hours by oral route for 5 days.       No current facility-administered medications for this  visit.       Review of patient's allergies indicates:  No Known Allergies     Review of Systems   Constitutional: Negative for chills and fever.   Cardiovascular:  Positive for leg swelling. Negative for claudication.   Skin:  Positive for suspicious lesions. Negative for color change and nail changes.   Musculoskeletal:  Negative for muscle cramps and muscle weakness.   Gastrointestinal:  Negative for nausea and vomiting.   Neurological:  Negative for numbness and paresthesias.        Objective:     Physical Exam  Constitutional:       Appearance: Normal appearance. She is not ill-appearing.   Cardiovascular:      Pulses:           Dorsalis pedis pulses are 2+ on the right side and 2+ on the left side.        Posterior tibial pulses are 2+ on the right side and 2+ on the left side.      Comments: CFT is < 3 seconds bilateral.  Pedal hair growth is present bilateral.  Moderate nonpitting lower extremity edema noted bilateral.  Toes are warm to touch bilateral.    Musculoskeletal:         General: Tenderness and deformity present. No signs of injury.      Right lower leg: No edema.      Left lower leg: No edema.      Comments: Muscle strength 5/5 in all muscle groups bilateral.  No tenderness nor crepitation with ROM of foot/ankle joints bilateral.  Semi-reducible contracture of toes 2-5 bilateral.  Pain with palpation to lateral gutter of the Rt. Ankle.  (-) anterior and posterior drawer sign on the Rt.     Skin:     General: Skin is warm.      Capillary Refill: Capillary refill takes 2 to 3 seconds.      Findings: Lesion present. No bruising, erythema, laceration, petechiae, rash or wound.      Comments: Pedal skin has normal turgor, temperature, and texture bilateral.  Toenails x 10 appear normotrophic. Porokeratosis noted to the Rt. Plantar central heel.  Less than 0.1cm in length and width.         Neurological:      General: No focal deficit present.      Mental Status: She is alert.      Sensory: No sensory  deficit.      Motor: No weakness or atrophy.      Comments: Light touch is intact bilateral.             Assessment:      Encounter Diagnosis   Name Primary?    Acute right ankle pain Yes         Plan:     Maci was seen today for foot pain.    Diagnoses and all orders for this visit:    Acute right ankle pain  -     LIDOcaine HCL 10 mg/ml (1%) injection 1 mL  -     dexAMETHasone injection 2 mg  -     triamcinolone acetonide injection 20 mg          I counseled the patient on her conditions, their implications and medical management.    Based on today's exam, she is having synovitis of the Rt. Ankle.    After sterilizing the area with an alcohol prep and applying ethyl chloride, the affected area of the Rt. Ankle was injected as per MAR.  The patient tolerated the injection well, with minimal blood loss noted from the injection site.  The injection site was then covered with a bandage.  Patient tolerated this quite well.        Drastic improvement in build up of the porokeratosis of the Rt. Heel.      To continue application of Ebanel and an occlusive dressing to the porokeratosis of the Rt. Plantar heel once daily x 1 month.    RTC in 4 weeks for follow up on the injection.     Lucien Diaz DPM

## 2023-11-27 NOTE — TELEPHONE ENCOUNTER
Spoke to pt's daughter and was able to schedule her for Friday    Quality 226: Preventive Care And Screening: Tobacco Use: Screening And Cessation Intervention: Patient screened for tobacco use and is an ex/non-smoker Quality 130: Documentation Of Current Medications In The Medical Record: Current Medications Documented Quality 431: Preventive Care And Screening: Unhealthy Alcohol Use - Screening: Patient not identified as an unhealthy alcohol user when screened for unhealthy alcohol use using a systematic screening method Detail Level: Detailed

## 2023-12-14 ENCOUNTER — OFFICE VISIT (OUTPATIENT)
Dept: PODIATRY | Facility: CLINIC | Age: 78
End: 2023-12-14
Payer: MEDICARE

## 2023-12-14 VITALS — HEIGHT: 64 IN | WEIGHT: 210.13 LBS | BODY MASS INDEX: 35.87 KG/M2

## 2023-12-14 DIAGNOSIS — M79.674 TOE PAIN, RIGHT: ICD-10-CM

## 2023-12-14 DIAGNOSIS — M19.071 ARTHRITIS OF ANKLE, RIGHT: ICD-10-CM

## 2023-12-14 DIAGNOSIS — M25.571 ACUTE RIGHT ANKLE PAIN: ICD-10-CM

## 2023-12-14 DIAGNOSIS — L60.0 INGROWN NAIL: Primary | ICD-10-CM

## 2023-12-14 PROCEDURE — 1125F PR PAIN SEVERITY QUANTIFIED, PAIN PRESENT: ICD-10-PCS | Mod: CPTII,S$GLB,, | Performed by: PODIATRIST

## 2023-12-14 PROCEDURE — 1125F AMNT PAIN NOTED PAIN PRSNT: CPT | Mod: CPTII,S$GLB,, | Performed by: PODIATRIST

## 2023-12-14 PROCEDURE — 1101F PT FALLS ASSESS-DOCD LE1/YR: CPT | Mod: CPTII,S$GLB,, | Performed by: PODIATRIST

## 2023-12-14 PROCEDURE — 1159F MED LIST DOCD IN RCRD: CPT | Mod: CPTII,S$GLB,, | Performed by: PODIATRIST

## 2023-12-14 PROCEDURE — 1101F PR PT FALLS ASSESS DOC 0-1 FALLS W/OUT INJ PAST YR: ICD-10-PCS | Mod: CPTII,S$GLB,, | Performed by: PODIATRIST

## 2023-12-14 PROCEDURE — 3288F PR FALLS RISK ASSESSMENT DOCUMENTED: ICD-10-PCS | Mod: CPTII,S$GLB,, | Performed by: PODIATRIST

## 2023-12-14 PROCEDURE — 99213 OFFICE O/P EST LOW 20 MIN: CPT | Mod: S$GLB,,, | Performed by: PODIATRIST

## 2023-12-14 PROCEDURE — 1159F PR MEDICATION LIST DOCUMENTED IN MEDICAL RECORD: ICD-10-PCS | Mod: CPTII,S$GLB,, | Performed by: PODIATRIST

## 2023-12-14 PROCEDURE — 99999 PR PBB SHADOW E&M-EST. PATIENT-LVL III: CPT | Mod: PBBFAC,,, | Performed by: PODIATRIST

## 2023-12-14 PROCEDURE — 3288F FALL RISK ASSESSMENT DOCD: CPT | Mod: CPTII,S$GLB,, | Performed by: PODIATRIST

## 2023-12-14 PROCEDURE — 99213 PR OFFICE/OUTPT VISIT, EST, LEVL III, 20-29 MIN: ICD-10-PCS | Mod: S$GLB,,, | Performed by: PODIATRIST

## 2023-12-14 PROCEDURE — 99999 PR PBB SHADOW E&M-EST. PATIENT-LVL III: ICD-10-PCS | Mod: PBBFAC,,, | Performed by: PODIATRIST

## 2023-12-14 NOTE — PROGRESS NOTES
Subjective:     Patient ID: Maci Moses is a 78 y.o. female.    Chief Complaint: Foot Pain (Spot on foot)    Patient presents to clinic with the chief complaint of a painful ingrown toenail involving the medial border of the Rt. Hallux.  She notes moderate pain, 4/10, from the site with applied pressure from shoe gear.  Symptoms are alleviated with rest.  Denies noticing signs of infection to said digit.  Also, notes having continued deep Rt. Ankle pain.  States this minimally improved with the last ankle joint injection.  Notes continued use of [place] shoe gear to minimize pressure to the ankle.   Denies any additional pedal complaints.      No past medical history on file.    Past Surgical History:   Procedure Laterality Date    HYSTERECTOMY      partial @ age 35     NOSE SURGERY      WRIST SURGERY         No family history on file.    Social History     Socioeconomic History    Marital status:    Tobacco Use    Smoking status: Former     Current packs/day: 0.00     Types: Cigarettes     Quit date: 2/2/2020     Years since quitting: 3.8    Smokeless tobacco: Never   Substance and Sexual Activity    Alcohol use: No    Drug use: No    Sexual activity: Never       Current Outpatient Medications   Medication Sig Dispense Refill    acetaminophen-codeine 300-30mg (TYLENOL #3) 300-30 mg Tab Take 1 tablet every 6 hours by oral route.      amoxicillin-clavulanate 500-125mg (AUGMENTIN) 500-125 mg Tab Take 1 tablet every 12 hours by oral route for 7 days.      aspirin 162.5 mg Cp24 ASPIRIN 81 MG TABS      cephALEXin (KEFLEX) 500 MG capsule Take 1 capsule 3 times a day by oral route for 7 days.      furosemide (LASIX) 20 MG tablet TAKE 1 TABLET EVERY DAY 90 tablet 0    potassium chloride SA (K-DUR,KLOR-CON) 20 MEQ tablet TAKE 1 TABLET (20 MEQ TOTAL) BY MOUTH ONCE DAILY. 90 tablet 1    rosuvastatin (CRESTOR) 10 MG tablet       traMADoL (ULTRAM) 50 mg tablet Take 1 tablet every 12 hours by oral route for 5 days.        No current facility-administered medications for this visit.       Review of patient's allergies indicates:  No Known Allergies     Review of Systems   Constitutional: Negative for chills and fever.   Cardiovascular:  Positive for leg swelling. Negative for claudication.   Skin:  Positive for suspicious lesions. Negative for color change and nail changes.   Musculoskeletal:  Negative for muscle cramps and muscle weakness.   Gastrointestinal:  Negative for nausea and vomiting.   Neurological:  Negative for numbness and paresthesias.        Objective:     Physical Exam  Constitutional:       Appearance: Normal appearance. She is not ill-appearing.   Cardiovascular:      Pulses:           Dorsalis pedis pulses are 2+ on the right side and 2+ on the left side.        Posterior tibial pulses are 2+ on the right side and 2+ on the left side.      Comments: CFT is < 3 seconds bilateral.  Pedal hair growth is present bilateral.  Moderate nonpitting lower extremity edema noted bilateral.  Toes are warm to touch bilateral.    Musculoskeletal:         General: Tenderness and deformity present. No signs of injury.      Right lower leg: No edema.      Left lower leg: No edema.      Comments: Muscle strength 5/5 in all muscle groups bilateral.  No tenderness nor crepitation with ROM of foot/ankle joints bilateral.  Semi-reducible contracture of toes 2-5 bilateral.  Pain with palpation to lateral gutter of the Rt. Ankle.  (-) anterior and posterior drawer sign on the Rt.  Pain with palpation to the medial border of the Rt. Hallux toenail.   Skin:     General: Skin is warm.      Capillary Refill: Capillary refill takes 2 to 3 seconds.      Findings: No bruising, erythema, laceration, lesion, petechiae, rash or wound.      Comments: Pedal skin has normal turgor, temperature, and texture bilateral.  Incurvation noted to both borders of bilateral hallux toenail.  No localized sign of infection noted.  Remaining toenails x 8 appear  normotrophic.       Neurological:      General: No focal deficit present.      Mental Status: She is alert.      Sensory: No sensory deficit.      Motor: No weakness or atrophy.      Comments: Light touch is intact bilateral.             Assessment:      Encounter Diagnoses   Name Primary?    Ingrown nail Yes    Toe pain, right     Acute right ankle pain     Arthritis of ankle, right          Plan:     Maci was seen today for foot pain.    Diagnoses and all orders for this visit:    Ingrown nail    Toe pain, right    Acute right ankle pain    Arthritis of ankle, right          I counseled the patient on her conditions, their implications and medical management.    Utilizing sterile toenail clippers I aggressively trimmed the offending medial nail border of the Rt. Hallux approximately 3 mm from its edge and carried the nail plate incision down at an angle in order to wedge out the offending cryptotic portion of the nail plate. The offending border was then removed in toto. This was performed without incident.  Patient tolerated the procedure well and related significant relief.     Recommend having regular pedicures in attempts to keep this issue at bay.      Being that her Rt. Ankle arthritis remains a source of pain, I recommend a repeat injection in the next month.    Recommend continued use of motion control shoes to minimize pressure to the ankle.     RTC in 4 weeks for a repeat injection of the Rt. Ankle.    Lucien Diaz DPM

## 2024-01-11 ENCOUNTER — OFFICE VISIT (OUTPATIENT)
Dept: PODIATRY | Facility: CLINIC | Age: 79
End: 2024-01-11
Payer: MEDICARE

## 2024-01-11 VITALS — WEIGHT: 210.13 LBS | BODY MASS INDEX: 35.87 KG/M2 | HEIGHT: 64 IN

## 2024-01-11 DIAGNOSIS — M62.461 GASTROCNEMIUS EQUINUS, RIGHT: ICD-10-CM

## 2024-01-11 DIAGNOSIS — M72.2 PLANTAR FASCIITIS: Primary | ICD-10-CM

## 2024-01-11 PROCEDURE — 99999 PR PBB SHADOW E&M-EST. PATIENT-LVL III: CPT | Mod: PBBFAC,,, | Performed by: PODIATRIST

## 2024-01-11 PROCEDURE — 1101F PT FALLS ASSESS-DOCD LE1/YR: CPT | Mod: CPTII,S$GLB,, | Performed by: PODIATRIST

## 2024-01-11 PROCEDURE — 99214 OFFICE O/P EST MOD 30 MIN: CPT | Mod: 25,S$GLB,, | Performed by: PODIATRIST

## 2024-01-11 PROCEDURE — 20550 NJX 1 TENDON SHEATH/LIGAMENT: CPT | Mod: RT,S$GLB,, | Performed by: PODIATRIST

## 2024-01-11 PROCEDURE — 3288F FALL RISK ASSESSMENT DOCD: CPT | Mod: CPTII,S$GLB,, | Performed by: PODIATRIST

## 2024-01-11 PROCEDURE — 1159F MED LIST DOCD IN RCRD: CPT | Mod: CPTII,S$GLB,, | Performed by: PODIATRIST

## 2024-01-11 PROCEDURE — 1125F AMNT PAIN NOTED PAIN PRSNT: CPT | Mod: CPTII,S$GLB,, | Performed by: PODIATRIST

## 2024-01-11 RX ORDER — DEXAMETHASONE SODIUM PHOSPHATE 4 MG/ML
2 INJECTION, SOLUTION INTRA-ARTICULAR; INTRALESIONAL; INTRAMUSCULAR; INTRAVENOUS; SOFT TISSUE
Status: COMPLETED | OUTPATIENT
Start: 2024-01-11 | End: 2024-01-11

## 2024-01-11 RX ORDER — TRIAMCINOLONE ACETONIDE 40 MG/ML
20 INJECTION, SUSPENSION INTRA-ARTICULAR; INTRAMUSCULAR ONCE
Status: COMPLETED | OUTPATIENT
Start: 2024-01-11 | End: 2024-01-11

## 2024-01-11 RX ORDER — LIDOCAINE HYDROCHLORIDE 10 MG/ML
1 INJECTION INFILTRATION; PERINEURAL
Status: COMPLETED | OUTPATIENT
Start: 2024-01-11 | End: 2024-01-11

## 2024-01-11 RX ADMIN — LIDOCAINE HYDROCHLORIDE 1 ML: 10 INJECTION INFILTRATION; PERINEURAL at 11:01

## 2024-01-11 RX ADMIN — DEXAMETHASONE SODIUM PHOSPHATE 2 MG: 4 INJECTION, SOLUTION INTRA-ARTICULAR; INTRALESIONAL; INTRAMUSCULAR; INTRAVENOUS; SOFT TISSUE at 11:01

## 2024-01-11 RX ADMIN — TRIAMCINOLONE ACETONIDE 20 MG: 40 INJECTION, SUSPENSION INTRA-ARTICULAR; INTRAMUSCULAR at 11:01

## 2024-01-11 NOTE — PROGRESS NOTES
Subjective:     Patient ID: Maci Moses is a 78 y.o. female.    Chief Complaint: Callouses and Ankle Pain (Right ankle )    Patient presents to clinic with the chief complaint of Rt. Medial proximal arch and heel pain.  Rates pain as a 10/10.  Symptoms are aggravated with all weight bearing and alleviated with rest.  Denies sustaining trauma to the limb.  Notes having an increase in peripheral edema in conjunction with this issue.  Has attempted to wear supportive shoe gear some of the time.  Denies taking her fluid pill on a regular basis.  Denies any additional pedal complaints.      No past medical history on file.    Past Surgical History:   Procedure Laterality Date    HYSTERECTOMY      partial @ age 35     NOSE SURGERY      WRIST SURGERY         No family history on file.    Social History     Socioeconomic History    Marital status:    Tobacco Use    Smoking status: Former     Current packs/day: 0.00     Types: Cigarettes     Quit date: 2/2/2020     Years since quitting: 3.9    Smokeless tobacco: Never   Substance and Sexual Activity    Alcohol use: No    Drug use: No    Sexual activity: Never       Current Outpatient Medications   Medication Sig Dispense Refill    acetaminophen-codeine 300-30mg (TYLENOL #3) 300-30 mg Tab Take 1 tablet every 6 hours by oral route.      amoxicillin-clavulanate 500-125mg (AUGMENTIN) 500-125 mg Tab Take 1 tablet every 12 hours by oral route for 7 days.      aspirin 162.5 mg Cp24 ASPIRIN 81 MG TABS      cephALEXin (KEFLEX) 500 MG capsule Take 1 capsule 3 times a day by oral route for 7 days.      furosemide (LASIX) 20 MG tablet TAKE 1 TABLET EVERY DAY 90 tablet 0    potassium chloride SA (K-DUR,KLOR-CON) 20 MEQ tablet TAKE 1 TABLET (20 MEQ TOTAL) BY MOUTH ONCE DAILY. 90 tablet 1    rosuvastatin (CRESTOR) 10 MG tablet       traMADoL (ULTRAM) 50 mg tablet Take 1 tablet every 12 hours by oral route for 5 days.       No current facility-administered medications for this  visit.       Review of patient's allergies indicates:  No Known Allergies     Review of Systems   Constitutional: Negative for chills and fever.   Cardiovascular:  Positive for leg swelling. Negative for claudication.   Skin:  Negative for color change, nail changes and suspicious lesions.   Musculoskeletal:  Negative for muscle cramps and muscle weakness.   Gastrointestinal:  Negative for nausea and vomiting.   Neurological:  Negative for numbness and paresthesias.        Objective:     Physical Exam  Constitutional:       Appearance: Normal appearance. She is not ill-appearing.   Cardiovascular:      Pulses:           Dorsalis pedis pulses are 2+ on the right side and 2+ on the left side.        Posterior tibial pulses are 2+ on the right side and 2+ on the left side.      Comments: CFT is < 3 seconds bilateral.  Pedal hair growth is present bilateral.  Moderate nonpitting lower extremity edema noted bilateral.  Toes are warm to touch bilateral.    Musculoskeletal:         General: Tenderness and deformity present. No signs of injury.      Right lower leg: No edema.      Left lower leg: No edema.      Comments: Muscle strength 5/5 in all muscle groups bilateral.  No tenderness nor crepitation with ROM of foot/ankle joints bilateral.  Semi-reducible contracture of toes 2-5 bilateral.  Pain with palpation to the medial calcaneal tubercle of the Rt. Foot.  Rt. Sided gastrocnemius equinus noted.     Skin:     General: Skin is warm.      Capillary Refill: Capillary refill takes 2 to 3 seconds.      Findings: No bruising, erythema, laceration, lesion, petechiae, rash or wound.      Comments: Pedal skin has normal turgor, temperature, and texture bilateral.  Incurvation noted to both borders of bilateral hallux toenail.  No localized sign of infection noted.  Remaining toenails x 8 appear normotrophic.       Neurological:      General: No focal deficit present.      Mental Status: She is alert.      Sensory: No sensory  deficit.      Motor: No weakness or atrophy.      Comments: Light touch is intact bilateral.             Assessment:      Encounter Diagnoses   Name Primary?    Plantar fasciitis - Right Foot Yes    Gastrocnemius equinus, right          Plan:     Maci was seen today for callouses and ankle pain.    Diagnoses and all orders for this visit:    Plantar fasciitis - Right Foot  -     LIDOcaine HCL 10 mg/ml (1%) injection 1 mL  -     dexAMETHasone injection 2 mg  -     triamcinolone acetonide injection 20 mg    Gastrocnemius equinus, right          I counseled the patient on her conditions, their implications and medical management.    After sterilizing the area with an alcohol prep and applying ethyl chloride, the affected area of the Rt. Medial heel was injected as per MAR.  The patient tolerated the injection well, with minimal blood loss noted from the injection site.  The injection site was then covered with a bandage.  Patient tolerated this quite well.        Recommend continued use of motion control shoes to minimize pressure to the ankle.     Advised to begin taking fluid pills as instructed.    To continue with elevation of the lower extremities while at rest.     RTC prn.    Lucien Diaz DPM

## 2024-01-17 ENCOUNTER — PATIENT MESSAGE (OUTPATIENT)
Dept: PODIATRY | Facility: CLINIC | Age: 79
End: 2024-01-17
Payer: MEDICARE

## 2024-01-18 ENCOUNTER — PATIENT MESSAGE (OUTPATIENT)
Dept: PODIATRY | Facility: CLINIC | Age: 79
End: 2024-01-18
Payer: MEDICARE

## 2024-01-18 DIAGNOSIS — M72.2 PLANTAR FASCIITIS: Primary | ICD-10-CM

## 2024-01-22 ENCOUNTER — PATIENT MESSAGE (OUTPATIENT)
Dept: PODIATRY | Facility: CLINIC | Age: 79
End: 2024-01-22
Payer: MEDICARE

## 2024-01-23 PROBLEM — Z71.89 ACP (ADVANCE CARE PLANNING): Status: ACTIVE | Noted: 2021-07-16

## 2024-01-23 PROBLEM — I73.9 PERIPHERAL VASCULAR DISEASE, UNSPECIFIED: Status: ACTIVE | Noted: 2024-01-23

## 2024-01-23 PROBLEM — I50.9 NEW ONSET OF CONGESTIVE HEART FAILURE: Status: ACTIVE | Noted: 2024-01-23

## 2024-01-23 PROBLEM — J18.9 PNEUMONIA OF LEFT LOWER LOBE DUE TO INFECTIOUS ORGANISM: Status: ACTIVE | Noted: 2024-01-23

## 2024-01-23 PROBLEM — J96.02 ACUTE RESPIRATORY FAILURE WITH HYPOXIA AND HYPERCARBIA: Status: ACTIVE | Noted: 2024-01-23

## 2024-01-23 PROBLEM — J96.01 ACUTE RESPIRATORY FAILURE WITH HYPOXIA AND HYPERCARBIA: Status: ACTIVE | Noted: 2024-01-23

## 2024-01-24 ENCOUNTER — TELEPHONE (OUTPATIENT)
Dept: PODIATRY | Facility: CLINIC | Age: 79
End: 2024-01-24
Payer: MEDICARE

## 2024-01-24 ENCOUNTER — PATIENT MESSAGE (OUTPATIENT)
Dept: PODIATRY | Facility: CLINIC | Age: 79
End: 2024-01-24
Payer: MEDICARE

## 2024-01-24 PROBLEM — Z71.89 ACP (ADVANCE CARE PLANNING): Status: RESOLVED | Noted: 2021-07-16 | Resolved: 2024-01-24

## 2024-01-24 NOTE — TELEPHONE ENCOUNTER
----- Message from Lucien Diaz DPM sent at 1/24/2024  5:45 AM CST -----  Please contact the patient's daughter and inform that the MRI does confirm plantar fasciitis of the heel.  Fortunately, everything else looks good with the exception of some arthritis (which is anticipated).  Going forward, the only real option for relief would be surgery, as she failed to obtain relief from the injection.  We can make a follow up appointment to further discuss and plan said treatment option.    ----- Message -----  From: Interface, Rad Results In  Sent: 1/23/2024   8:48 AM CST  To: Lucien Diaz DPM

## 2024-01-27 PROBLEM — R53.81 DEBILITY: Status: ACTIVE | Noted: 2024-01-27

## 2024-01-27 PROBLEM — I49.3 PVC'S (PREMATURE VENTRICULAR CONTRACTIONS): Status: ACTIVE | Noted: 2024-01-27

## 2024-01-28 PROBLEM — G93.41 ACUTE METABOLIC ENCEPHALOPATHY: Status: ACTIVE | Noted: 2024-01-28

## 2024-01-31 PROBLEM — J96.11 CHRONIC RESPIRATORY FAILURE WITH HYPOXIA AND HYPERCAPNIA: Status: ACTIVE | Noted: 2024-01-31

## 2024-01-31 PROBLEM — J96.12 CHRONIC RESPIRATORY FAILURE WITH HYPOXIA AND HYPERCAPNIA: Status: ACTIVE | Noted: 2024-01-31

## 2024-03-14 ENCOUNTER — OFFICE VISIT (OUTPATIENT)
Dept: CARDIOLOGY | Facility: CLINIC | Age: 79
End: 2024-03-14
Payer: MEDICARE

## 2024-03-14 VITALS
BODY MASS INDEX: 34.28 KG/M2 | HEIGHT: 64 IN | HEART RATE: 72 BPM | WEIGHT: 200.81 LBS | SYSTOLIC BLOOD PRESSURE: 123 MMHG | DIASTOLIC BLOOD PRESSURE: 72 MMHG

## 2024-03-14 DIAGNOSIS — I49.3 PVC (PREMATURE VENTRICULAR CONTRACTION): ICD-10-CM

## 2024-03-14 DIAGNOSIS — I50.32 CHRONIC HEART FAILURE WITH PRESERVED EJECTION FRACTION (HFPEF): Primary | ICD-10-CM

## 2024-03-14 DIAGNOSIS — Z87.891 FORMER HEAVY CIGARETTE SMOKER (20-39 PER DAY): ICD-10-CM

## 2024-03-14 PROCEDURE — 99214 OFFICE O/P EST MOD 30 MIN: CPT | Mod: S$GLB,,, | Performed by: INTERNAL MEDICINE

## 2024-03-14 PROCEDURE — 99999 PR PBB SHADOW E&M-EST. PATIENT-LVL III: CPT | Mod: PBBFAC,,, | Performed by: INTERNAL MEDICINE

## 2024-03-14 PROCEDURE — 3288F FALL RISK ASSESSMENT DOCD: CPT | Mod: CPTII,S$GLB,, | Performed by: INTERNAL MEDICINE

## 2024-03-14 PROCEDURE — 3078F DIAST BP <80 MM HG: CPT | Mod: CPTII,S$GLB,, | Performed by: INTERNAL MEDICINE

## 2024-03-14 PROCEDURE — 1101F PT FALLS ASSESS-DOCD LE1/YR: CPT | Mod: CPTII,S$GLB,, | Performed by: INTERNAL MEDICINE

## 2024-03-14 PROCEDURE — 1159F MED LIST DOCD IN RCRD: CPT | Mod: CPTII,S$GLB,, | Performed by: INTERNAL MEDICINE

## 2024-03-14 PROCEDURE — 1126F AMNT PAIN NOTED NONE PRSNT: CPT | Mod: CPTII,S$GLB,, | Performed by: INTERNAL MEDICINE

## 2024-03-14 PROCEDURE — 3074F SYST BP LT 130 MM HG: CPT | Mod: CPTII,S$GLB,, | Performed by: INTERNAL MEDICINE

## 2024-03-14 NOTE — PROGRESS NOTES
[facility]   Cardiology Clinic Note  Date: 3/14/24    Patient: Maci Moses, 1945, [id number]  Primary Care Provider: Verona Dupont MD     Chief Complaint/Reason for Referral: CHF    Subjective     Maci Moses is a 78 y.o. female who presents for hospital follow up. They are accompanied by daughter Martina.    She was admitted for acute respiratory failure in January 2024.  This was due to the combination of pneumonia, COPD exacerbation and some heart failure.  TTE reviewed (technically difficult) with grossly normal biventricular size and systolic function.  Trileaflet aortic valve with mild stenosis.  Normal biatrial size.  Poorly characterized mitral regurgitation, at least mild-to-moderate.  PASP was not obtained. She is now on 2 LPM NC around the clock. She as not on oxygen prior. She had been having edema and dyspnea on exertion for months. No angina. No syncope before or since. She has some palpitations. Weight is down 29 lbs since discharge.     She is taking furosemide 40 mg once daily and an extra as needed (only once since discharge).,      Focused Past History includes:  Obesity  COPD - she is scheduled to have PFTs next few months  Recently former smoker. 50-60 pack-years  No history of, stroke/TIA, MI, coronary revascularization, diabetes or pathologic bleeding      Current Outpatient Medications   Medication Sig    acetaminophen (TYLENOL ARTHRITIS ORAL) Take 650 mg by mouth every 8 (eight) hours as needed (Pain). Indications: pain    albuterol (PROVENTIL/VENTOLIN HFA) 90 mcg/actuation inhaler Inhale 2 puffs into the lungs every 6 (six) hours as needed for Wheezing. Rescue    aspirin 81 mg Cap Take 81 mg by mouth once daily. Indications: stroke prevention    atorvastatin (LIPITOR) 40 MG tablet Take 1 tablet (40 mg total) by mouth once daily.    docusate sodium (COLACE) 100 MG capsule Take 100 mg by mouth 3 (three) times daily as needed. Indications: constipation     "fluticasone-umeclidin-vilanter (TRELEGY ELLIPTA) 100-62.5-25 mcg DsDv Inhale 1 puff into the lungs once daily.    furosemide (LASIX) 40 MG tablet Take 1 tablet (40 mg total) by mouth once daily.    furosemide (LASIX) 40 MG tablet Take 1 tablet (40 mg total) by mouth daily as needed (take 1 tablet as needed in addition to 40mg daily Lasix for weight gain of 3 lbs overnight or 5 lbs in a week).    nicotine (NICODERM CQ) 21 mg/24 hr Place 1 patch onto the skin once daily.    OXYGEN-AIR DELIVERY SYSTEMS MISC Inhale 6 L/min into the lungs continuous. Indications: COPD     No current facility-administered medications for this visit.               Review of Systems  Constitutional: negative for fevers, night sweats, and weight loss  Eyes: negative for visual disturbance, diplopia  Respiratory: negative for cough, hemoptysis, sputum, and wheezing  Cardiovascular: see HPI  Gastrointestinal: negative for abdominal pain, bright red blood per rectum, change in bowel habits, dysphagia, melena, and reflux symptoms  Genitourinary:negative for dysuria, frequency, and hematuria  Hematologic/lymphatic: negative for bleeding, easy bruising, and lymphadenopathy  Musculoskeletal:negative for arthralgias, back pain, and myalgias  Neurological: negative for gait problems, paresthesia, speech problems, vertigo, and weakness  Behavioral/Psych: negative for excessive alcohol consumption, illegal drug usage, and sleep disturbance    No past medical history on file.  ----------------------------  Hysterectomy      Comment:  partial @ age 35   Nose surgery  Wrist surgery     No family history on file.  Social History     Tobacco Use    Smoking status: Former     Current packs/day: 0.00     Types: Cigarettes     Quit date: 2020     Years since quittin.1    Smokeless tobacco: Never   Substance Use Topics    Alcohol use: No    Drug use: No         Physical Exam  /72   Pulse 72   Ht 5' 4" (1.626 m)   Wt 91.1 kg (200 lb 13.4 oz)   " "BMI 34.47 kg/m²   Body surface area is 2.03 meters squared.  Body mass index is 34.47 kg/m².    General appearance: alert, appears stated age, cooperative, and no distress  Head: Normocephalic, without obvious abnormality, atraumatic  Neck: no carotid bruit, no JVD, and supple, symmetrical, trachea midline  Lungs:  Decreased breath sounds bilaterally  Heart: regular rate and rhythm; S1, S2 normal, no murmur, click, rub or gallop  Abdomen: soft, non-tender, no distended  Extremities: extremities atraumatic, no pitting edema  Skin: warm, no cyanosis, no pathologic ecchymosis in exposed portions  Neurologic: Grossly normal. A&O x3      Labs Reviewed     Lab Results   Component Value Date    WBC 6.79 01/31/2024    HGB 15.3 01/31/2024     01/31/2024       Lab Results   Component Value Date     02/15/2024    K 4.9 02/15/2024    CL 96 02/15/2024    CO2 37 (H) 02/15/2024    CALCIUM 10.0 02/15/2024    MG 2.1 01/25/2024    GLU 93 02/15/2024    PROT 6.6 01/23/2024       Lab Results   Component Value Date    BUN 20 (H) 02/15/2024    BUN 26 (H) 02/01/2024    BUN 32 (H) 01/31/2024    CREATININE 0.46 (L) 02/15/2024    CREATININE 0.38 (L) 02/01/2024    CREATININE 0.45 (L) 01/31/2024    EGFRNORACEVR >60 02/15/2024    EGFRNORACEVR >60 02/01/2024    EGFRNORACEVR >60 01/31/2024       Lab Results   Component Value Date    ALBUMIN 3.7 01/23/2024    BILITOT 0.5 01/23/2024    ALKPHOS 78 01/23/2024    ALT 30 01/23/2024       Lab Results   Component Value Date    TRIG 97 11/12/2022    CHOL 147 11/12/2022    HDL 54 11/12/2022    LDLCALC 73.6 11/12/2022    LDLCALC 144.0 07/13/2021       No results found for: "HGBA1C", "TSH", "FREET4"    Lab Results   Component Value Date    NTPROBNP 507 02/15/2024    NTPROBNP 2150 (H) 01/23/2024         EKG 1/23/24:  NSR, LAFB, PRWP            ASSESSMENT & PLAN:    This is a 78 y.o.  female, recently admitted for acute HFpEF and COPD exacerbation.  Currently euvolemic.  Estimated dry " weight is 200 lb.  She is obese and has COPD     1. Chronic heart failure with preserved ejection fraction (HFpEF)  Echo      2. PVC (premature ventricular contraction)  Cardiac event monitor      3. Former heavy cigarette smoker (20-39 per day)             Stop metoprolol.  This was started in the hospital for PVCs.  We will check a 14 day event monitor both for PVCs and to rule out atrial fibrillation   Continue furosemide 40 mg once daily and as needed  Continue aspirin and atorvastatin.  She has no angina but certainly high-risk for underlying CAD given her long smoking history  Repeat TTE to evaluate the mitral valve better.  Surveillance for aortic stenosis in 2026  Emphasized the importance of modifying lifestyle related risk factors including smoking cessation, limiting alcohol intake, exercise, diet most resembling a Mediterranean diet.    I appreciate the opportunity to participate in Maci Moses 's care today.  Please follow up with me in 2-3 months.      William Landis MD, [facility]  Interventional Cardiology/Structural Heart Disease  [facility]  Office: [phone]            Parts of this note were completed using voice recognition software. Please excuse any misspellings or syntax errors and reach out to me with questions.

## 2024-03-14 NOTE — PATIENT INSTRUCTIONS
Stop the metoprolol for now  Continue furosemide (Lasix) as is - keep a log of your weights daily. If you have to take and extra Lasix more than 2 times a week please call me  Return in 2-3 months

## 2024-03-28 ENCOUNTER — HOSPITAL ENCOUNTER (OUTPATIENT)
Dept: CARDIOLOGY | Facility: HOSPITAL | Age: 79
Discharge: HOME | End: 2024-03-28
Attending: INTERNAL MEDICINE
Payer: MEDICARE

## 2024-03-28 VITALS — HEIGHT: 64 IN | WEIGHT: 195 LBS | BODY MASS INDEX: 33.29 KG/M2

## 2024-03-28 DIAGNOSIS — I49.3 PVC (PREMATURE VENTRICULAR CONTRACTION): ICD-10-CM

## 2024-03-28 DIAGNOSIS — I50.32 CHRONIC HEART FAILURE WITH PRESERVED EJECTION FRACTION (HFPEF): ICD-10-CM

## 2024-03-28 PROCEDURE — 93271 ECG/MONITORING AND ANALYSIS: CPT | Mod: PO

## 2024-03-28 PROCEDURE — 93306 TTE W/DOPPLER COMPLETE: CPT | Mod: PO

## 2024-03-28 PROCEDURE — 93272 ECG/REVIEW INTERPRET ONLY: CPT | Mod: ,,, | Performed by: INTERNAL MEDICINE

## 2024-03-28 PROCEDURE — 93306 TTE W/DOPPLER COMPLETE: CPT | Mod: 26,,, | Performed by: INTERNAL MEDICINE

## 2024-03-30 LAB
AV INDEX (PROSTH): 0.62
AV MEAN GRADIENT: 8 MMHG
AV PEAK GRADIENT: 14 MMHG
AV VALVE AREA BY VELOCITY RATIO: 1.68 CM²
AV VALVE AREA: 1.88 CM²
AV VELOCITY RATIO: 0.55
BSA FOR ECHO PROCEDURE: 2 M2
CV ECHO LV RWT: 0.37 CM
DOP CALC AO PEAK VEL: 1.9 M/S
DOP CALC AO VTI: 46.9 CM
DOP CALC LVOT AREA: 3 CM2
DOP CALC LVOT DIAMETER: 1.97 CM
DOP CALC LVOT PEAK VEL: 1.05 M/S
DOP CALC LVOT STROKE VOLUME: 88.04 CM3
DOP CALCLVOT PEAK VEL VTI: 28.9 CM
E WAVE DECELERATION TIME: 180.11 MSEC
E/A RATIO: 0.74
E/E' RATIO: 14.31 M/S
ECHO LV POSTERIOR WALL: 1.04 CM (ref 0.6–1.1)
FRACTIONAL SHORTENING: 19 % (ref 28–44)
INTERVENTRICULAR SEPTUM: 1 CM (ref 0.6–1.1)
LEFT ATRIUM VOLUME INDEX MOD: 25.3 ML/M2
LEFT ATRIUM VOLUME MOD: 49 CM3
LEFT INTERNAL DIMENSION IN SYSTOLE: 4.49 CM (ref 2.1–4)
LEFT VENTRICLE DIASTOLIC VOLUME INDEX: 78 ML/M2
LEFT VENTRICLE DIASTOLIC VOLUME: 151.32 ML
LEFT VENTRICLE MASS INDEX: 115 G/M2
LEFT VENTRICLE SYSTOLIC VOLUME INDEX: 47.5 ML/M2
LEFT VENTRICLE SYSTOLIC VOLUME: 92.11 ML
LEFT VENTRICULAR INTERNAL DIMENSION IN DIASTOLE: 5.56 CM (ref 3.5–6)
LEFT VENTRICULAR MASS: 222.82 G
LV LATERAL E/E' RATIO: 13.29 M/S
LV SEPTAL E/E' RATIO: 15.5 M/S
LVOT MG: 2.45 MMHG
LVOT MV: 0.74 CM/S
MV PEAK A VEL: 1.26 M/S
MV PEAK E VEL: 0.93 M/S
MV STENOSIS PRESSURE HALF TIME: 52.23 MS
MV VALVE AREA P 1/2 METHOD: 4.21 CM2
PISA TR MAX VEL: 1.2 M/S
PULM VEIN S/D RATIO: 1.29
PV PEAK D VEL: 0.41 M/S
PV PEAK S VEL: 0.53 M/S
RA PRESSURE ESTIMATED: 3 MMHG
RIGHT VENTRICULAR END-DIASTOLIC DIMENSION: 3.31 CM
RIGHT VENTRICULAR LENGTH IN DIASTOLE (APICAL 4-CHAMBER VIEW): 8.73 CM
RV MID DIAMA: 3.15 CM
RV TB RVSP: 4 MMHG
RV TISSUE DOPPLER FREE WALL SYSTOLIC VELOCITY 1 (APICAL 4 CHAMBER VIEW): 14.19 CM/S
SINUS: 2.64 CM
STJ: 2.31 CM
TDI LATERAL: 0.07 M/S
TDI SEPTAL: 0.06 M/S
TDI: 0.07 M/S
TR MAX PG: 6 MMHG
TRICUSPID ANNULAR PLANE SYSTOLIC EXCURSION: 2.36 CM
TV REST PULMONARY ARTERY PRESSURE: 9 MMHG
Z-SCORE OF LEFT VENTRICULAR DIMENSION IN END DIASTOLE: 0.09
Z-SCORE OF LEFT VENTRICULAR DIMENSION IN END SYSTOLE: 2.22

## 2024-04-22 ENCOUNTER — TELEPHONE (OUTPATIENT)
Dept: CARDIOLOGY | Facility: CLINIC | Age: 79
End: 2024-04-22
Payer: MEDICARE

## 2024-04-22 NOTE — TELEPHONE ENCOUNTER
----- Message from William Landis MD sent at 4/21/2024  1:05 PM CDT -----  8% PVCs, no AF. 2 runs of MMVT (different morphologies): one is slow at 120; the other is 11 beats long and up to 200 bpm.     Please schedule Lexiscan MPI; it's ordered.

## 2024-04-29 ENCOUNTER — PATIENT MESSAGE (OUTPATIENT)
Dept: RADIOLOGY | Facility: HOSPITAL | Age: 79
End: 2024-04-29
Payer: MEDICARE

## 2024-04-29 PROBLEM — J18.9 PNEUMONIA OF LEFT LOWER LOBE DUE TO INFECTIOUS ORGANISM: Status: RESOLVED | Noted: 2024-01-23 | Resolved: 2024-04-29

## 2024-04-29 PROBLEM — J96.02 ACUTE RESPIRATORY FAILURE WITH HYPOXIA AND HYPERCARBIA: Status: RESOLVED | Noted: 2024-01-23 | Resolved: 2024-04-29

## 2024-04-29 PROBLEM — J96.01 ACUTE RESPIRATORY FAILURE WITH HYPOXIA AND HYPERCARBIA: Status: RESOLVED | Noted: 2024-01-23 | Resolved: 2024-04-29

## 2024-05-01 ENCOUNTER — HOSPITAL ENCOUNTER (OUTPATIENT)
Dept: RADIOLOGY | Facility: HOSPITAL | Age: 79
Discharge: HOME | End: 2024-05-01
Attending: INTERNAL MEDICINE
Payer: MEDICARE

## 2024-05-01 ENCOUNTER — HOSPITAL ENCOUNTER (OUTPATIENT)
Dept: CARDIOLOGY | Facility: HOSPITAL | Age: 79
Discharge: HOME | End: 2024-05-01
Attending: INTERNAL MEDICINE
Payer: MEDICARE

## 2024-05-01 ENCOUNTER — PATIENT MESSAGE (OUTPATIENT)
Dept: CARDIOLOGY | Facility: CLINIC | Age: 79
End: 2024-05-01

## 2024-05-01 VITALS — WEIGHT: 187 LBS | HEIGHT: 64 IN | BODY MASS INDEX: 31.92 KG/M2

## 2024-05-01 DIAGNOSIS — I47.29 NONSUSTAINED MONOMORPHIC VENTRICULAR TACHYCARDIA: ICD-10-CM

## 2024-05-01 DIAGNOSIS — I49.3 FREQUENT PVCS: ICD-10-CM

## 2024-05-01 DIAGNOSIS — R06.09 DOE (DYSPNEA ON EXERTION): ICD-10-CM

## 2024-05-01 LAB
CV PHARM DOSE: 0.4 MG
CV STRESS BASE HR: 79 BPM
DIASTOLIC BLOOD PRESSURE: 82 MMHG
NUC STRESS EJECTION FRACTION: 49 %
OHS CV CPX 1 MINUTE RECOVERY HEART RATE: 94 BPM
OHS CV CPX 85 PERCENT MAX PREDICTED HEART RATE MALE: 121
OHS CV CPX MAX PREDICTED HEART RATE: 142
OHS CV CPX PATIENT IS FEMALE: 1
OHS CV CPX PATIENT IS MALE: 0
OHS CV CPX PEAK DIASTOLIC BLOOD PRESSURE: 82 MMHG
OHS CV CPX PEAK HEAR RATE: 96 BPM
OHS CV CPX PEAK RATE PRESSURE PRODUCT: ABNORMAL
OHS CV CPX PEAK SYSTOLIC BLOOD PRESSURE: 146 MMHG
OHS CV CPX PERCENT MAX PREDICTED HEART RATE ACHIEVED: 70
OHS CV CPX RATE PRESSURE PRODUCT PRESENTING: ABNORMAL
OHS CV PHARM TIME: 1521 MIN
SYSTOLIC BLOOD PRESSURE: 146 MMHG

## 2024-05-01 PROCEDURE — 78452 HT MUSCLE IMAGE SPECT MULT: CPT | Mod: PO

## 2024-05-01 PROCEDURE — 93018 CV STRESS TEST I&R ONLY: CPT | Mod: ,,, | Performed by: INTERNAL MEDICINE

## 2024-05-01 PROCEDURE — 93016 CV STRESS TEST SUPVJ ONLY: CPT | Mod: ,,, | Performed by: INTERNAL MEDICINE

## 2024-05-01 PROCEDURE — 78452 HT MUSCLE IMAGE SPECT MULT: CPT | Mod: 26,,, | Performed by: INTERNAL MEDICINE

## 2024-05-01 PROCEDURE — 93017 CV STRESS TEST TRACING ONLY: CPT | Mod: PO

## 2024-05-01 PROCEDURE — 63600175 PHARM REV CODE 636 W HCPCS: Mod: PO | Performed by: INTERNAL MEDICINE

## 2024-05-01 PROCEDURE — A9502 TC99M TETROFOSMIN: HCPCS | Mod: PO | Performed by: INTERNAL MEDICINE

## 2024-05-01 RX ORDER — REGADENOSON 0.08 MG/ML
0.4 INJECTION, SOLUTION INTRAVENOUS
Status: COMPLETED | OUTPATIENT
Start: 2024-05-01 | End: 2024-05-01

## 2024-05-01 RX ADMIN — TETROFOSMIN 10.8 MILLICURIE: 1.38 INJECTION, POWDER, LYOPHILIZED, FOR SOLUTION INTRAVENOUS at 03:05

## 2024-05-01 RX ADMIN — TETROFOSMIN 33 MILLICURIE: 1.38 INJECTION, POWDER, LYOPHILIZED, FOR SOLUTION INTRAVENOUS at 03:05

## 2024-05-01 RX ADMIN — REGADENOSON 0.4 MG: 0.08 INJECTION, SOLUTION INTRAVENOUS at 03:05

## 2024-05-01 NOTE — PROGRESS NOTES
Recommend coronary angiography due to mildly reduced EF, frequent PVCs/NSVT, anterior defect in high risk patient.. please schedule with me.

## 2024-05-02 ENCOUNTER — PATIENT MESSAGE (OUTPATIENT)
Dept: CARDIOLOGY | Facility: CLINIC | Age: 79
End: 2024-05-02
Payer: MEDICARE

## 2024-05-02 DIAGNOSIS — I50.9 CONGESTIVE HEART FAILURE, UNSPECIFIED HF CHRONICITY, UNSPECIFIED HEART FAILURE TYPE: ICD-10-CM

## 2024-05-02 DIAGNOSIS — I47.29 NSVT (NONSUSTAINED VENTRICULAR TACHYCARDIA): ICD-10-CM

## 2024-05-02 DIAGNOSIS — R94.39 POSITIVE CARDIAC STRESS TEST: ICD-10-CM

## 2024-05-02 DIAGNOSIS — I49.3 PVC (PREMATURE VENTRICULAR CONTRACTION): Primary | ICD-10-CM

## 2024-05-02 RX ORDER — SODIUM CHLORIDE 0.9 % (FLUSH) 0.9 %
10 SYRINGE (ML) INJECTION
Status: DISCONTINUED | OUTPATIENT
Start: 2024-05-02 | End: 2024-05-15 | Stop reason: HOSPADM

## 2024-05-02 RX ORDER — SODIUM CHLORIDE 9 MG/ML
INJECTION, SOLUTION INTRAVENOUS ONCE
Status: CANCELLED | OUTPATIENT
Start: 2024-05-02 | End: 2024-05-02

## 2024-05-02 NOTE — PROGRESS NOTES
Angiogram    Arrive for procedure at: [facility] on 5/15/24 at 8 am. Your procedure is scheduled for 10 am. Enter through the main entrance and check in at he reception desk. They will direct you upstairs to the cath lab.    You will receive a phone call from [facility] Pre-Op Department with further instructions and exact arrival time prior to your scheduled procedure.    Notify the nurse if you are ALLERGIC TO IODINE.    FASTING: You MAY NOT have anything to eat or drink AFTER MIDNIGHT the day before your procedure.       MEDICATIONS: You may take your regular morning medications with water. If there are any medications that you should not take, you will be instructed to hold them for that morning.    CARDIOLOGY PRE-PROCEDURE MEDICATION ORDERS:  ** Please hold any medications that are checked below:    HOLD   # OF DAYS TO HOLD  Lasix: do not take the morning of your procedure    CONTINUE the Following Medications   Please continue all your other medications including your aspirin    WHAT TO EXPECT:    How long will the procedure take?  The procedure will take an average of 1 - 2 hours to perform.  After the procedure, you will need to lay flat for around 4 - 6 hours to minimize bleeding from the puncture site. If the wrist is accessed you will need to keep your arm still as instructed by the nurse.    When can I go home?  You may be able to be discharged home that same afternoon if there were no complications.  If you have one of the following: balloon; stent; pacemaker or defibrillator procedures, you may spend one night for observation.  Your doctor will determine your discharge based upon your progress.  The results of your procedure will be discussed with you before you are discharged.  Any further testing or procedures will be scheduled for you either before you leave or you will be instructed to call for a future appointment.      TRANSPORTATION:  PLEASE ARRANGE TO HAVE SOMEONE DRIVE YOU HOME  FOLLOWING YOUR PROCEDURE, YOU WILL NOT BE ALLOWED TO DRIVE.

## 2024-05-06 PROBLEM — J96.11 CHRONIC RESPIRATORY FAILURE WITH HYPOXIA AND HYPERCAPNIA: Status: RESOLVED | Noted: 2024-01-31 | Resolved: 2024-05-06

## 2024-05-06 PROBLEM — J96.12 CHRONIC RESPIRATORY FAILURE WITH HYPOXIA AND HYPERCAPNIA: Status: RESOLVED | Noted: 2024-01-31 | Resolved: 2024-05-06

## 2024-05-15 PROBLEM — Z95.5 S/P DRUG ELUTING CORONARY STENT PLACEMENT: Status: ACTIVE | Noted: 2024-05-15

## 2024-06-19 ENCOUNTER — TELEPHONE (OUTPATIENT)
Dept: CARDIOLOGY | Facility: CLINIC | Age: 79
End: 2024-06-19
Payer: MEDICARE

## 2024-06-19 NOTE — TELEPHONE ENCOUNTER
Pt needing CV risk and medication instructions for EBUS with Dr. Michaels. Pt taking ASA and plavix.    Fax: [phone]

## 2024-06-21 ENCOUNTER — OFFICE VISIT (OUTPATIENT)
Dept: CARDIOLOGY | Facility: CLINIC | Age: 79
End: 2024-06-21
Attending: INTERNAL MEDICINE
Payer: MEDICARE

## 2024-06-21 VITALS
SYSTOLIC BLOOD PRESSURE: 130 MMHG | DIASTOLIC BLOOD PRESSURE: 73 MMHG | WEIGHT: 176.38 LBS | HEIGHT: 64 IN | RESPIRATION RATE: 18 BRPM | BODY MASS INDEX: 30.11 KG/M2 | HEART RATE: 82 BPM

## 2024-06-21 DIAGNOSIS — I50.32 CHRONIC HEART FAILURE WITH PRESERVED EJECTION FRACTION (HFPEF): ICD-10-CM

## 2024-06-21 DIAGNOSIS — I25.10 CORONARY ARTERY DISEASE INVOLVING NATIVE CORONARY ARTERY OF NATIVE HEART WITHOUT ANGINA PECTORIS: ICD-10-CM

## 2024-06-21 DIAGNOSIS — Z95.5 S/P DRUG ELUTING CORONARY STENT PLACEMENT: Primary | ICD-10-CM

## 2024-06-21 DIAGNOSIS — I35.0 MILD AORTIC STENOSIS: ICD-10-CM

## 2024-06-21 DIAGNOSIS — Z87.891 FORMER HEAVY CIGARETTE SMOKER (20-39 PER DAY): ICD-10-CM

## 2024-06-21 PROCEDURE — 99999 PR PBB SHADOW E&M-EST. PATIENT-LVL III: CPT | Mod: PBBFAC,,, | Performed by: INTERNAL MEDICINE

## 2024-06-21 RX ORDER — TORSEMIDE 10 MG/1
10 TABLET ORAL DAILY
Qty: 90 TABLET | Refills: 1 | Status: SHIPPED | OUTPATIENT
Start: 2024-06-21 | End: 2025-06-21

## 2024-06-21 RX ORDER — ATORVASTATIN CALCIUM 40 MG/1
40 TABLET, FILM COATED ORAL DAILY
Qty: 90 TABLET | Refills: 3 | Status: SHIPPED | OUTPATIENT
Start: 2024-06-21 | End: 2025-06-21

## 2024-06-21 RX ORDER — SPIRONOLACTONE 25 MG/1
25 TABLET ORAL DAILY
Qty: 90 TABLET | Refills: 1 | Status: SHIPPED | OUTPATIENT
Start: 2024-06-21 | End: 2025-06-21

## 2024-06-21 RX ORDER — CLOPIDOGREL BISULFATE 75 MG/1
75 TABLET ORAL DAILY
Qty: 90 TABLET | Refills: 3 | Status: SHIPPED | OUTPATIENT
Start: 2024-06-21 | End: 2025-06-21

## 2024-06-21 NOTE — PROGRESS NOTES
[facility]   Cardiology Clinic Note  Date: 6/21/24    Patient: Maci Moses, 1945, [id number]  Primary Care Provider: Verona Dupont MD     Chief Complaint/Reason for Referral: CHF    Subjective     Maci Moses is a 78 y.o. female who presents for hospital follow up. They are accompanied by daughter Martina.    She was admitted for acute respiratory failure in January 2024.  This was due to the combination of pneumonia, COPD exacerbation and some heart failure.  TTE reviewed (technically difficult) with grossly normal biventricular size and systolic function.  Trileaflet aortic valve with mild stenosis.  Normal biatrial size.  Poorly characterized mitral regurgitation, at least mild-to-moderate.  PASP was not obtained. She is now on 2 LPM NC around the clock. She as not on oxygen prior. She had been having edema and dyspnea on exertion for months. No angina. No syncope before or since. She has some palpitations. Weight is down 29 lbs since discharge.     She is taking torsemide 10 mg once daily and spironolactone 25 mg daily. She is 28lbs down since February. She will start going to cardiac rehab. Tolerating Dual antiplatelet therapy well. No pathologic bleeding. No chest discomfort. No orthopnea. No palpitations.       Focused Past History includes:  Two week monitor March 2024 by report:  8% PVCs.  Twelve PVC in 2 runs of nonsustained VT  TTE March 2024: EF 50-55%.  Mild aortic stenosis with peak velocity 1.9, mean gradient 8, DI 0.62.  Angiogram 05/15/2024:  PCI to large OM1 with single MARINA.  Obesity  COPD - she is scheduled to have PFTs next few months  Recently former smoker. 50-60 pack-years  No history of, stroke/TIA, MI, coronary revascularization, diabetes or pathologic bleeding      Current Outpatient Medications   Medication Sig    albuterol (PROVENTIL/VENTOLIN HFA) 90 mcg/actuation inhaler Inhale 2 puffs into the lungs every 6 (six) hours as needed for Wheezing.  Rescue    aspirin 81 mg Cap Take 81 mg by mouth once daily. Indications: stroke prevention    docusate sodium (COLACE) 100 MG capsule Take 100 mg by mouth 3 (three) times daily as needed.    fluticasone-umeclidin-vilanter (TRELEGY ELLIPTA) 100-62.5-25 mcg DsDv Inhale 1 puff into the lungs once daily.    OXYGEN-AIR DELIVERY SYSTEMS MISC Inhale 3 L/min into the lungs continuous. Pt using  oxygen less.  Indications: COPD    atorvastatin (LIPITOR) 40 MG tablet Take 1 tablet (40 mg total) by mouth once daily.    clopidogreL (PLAVIX) 75 mg tablet Take 1 tablet (75 mg total) by mouth once daily.    spironolactone (ALDACTONE) 25 MG tablet Take 1 tablet (25 mg total) by mouth once daily.    torsemide (DEMADEX) 10 MG Tab Take 1 tablet (10 mg total) by mouth once daily.     No current facility-administered medications for this visit.               Review of Systems  Constitutional: negative for fevers, night sweats, and weight loss  Eyes: negative for visual disturbance, diplopia  Respiratory: negative for cough, hemoptysis, sputum, and wheezing  Cardiovascular: see HPI  Gastrointestinal: negative for abdominal pain, bright red blood per rectum, change in bowel habits, dysphagia, melena, and reflux symptoms  Genitourinary:negative for dysuria, frequency, and hematuria  Hematologic/lymphatic: negative for bleeding, easy bruising, and lymphadenopathy  Musculoskeletal:negative for arthralgias, back pain, and myalgias  Neurological: negative for gait problems, paresthesia, speech problems, vertigo, and weakness  Behavioral/Psych: negative for excessive alcohol consumption, illegal drug usage, and sleep disturbance    -------------------------------------    COPD (chronic obstructive pulmonary disease)    Hyperlipidemia    PVC (premature ventricular contraction)    Requires supplemental oxygen    Continuous O2 @2L per NC    Sleep apnea    uses CPAP     ----------------------------    Angiogram, coronary, with left heart  "catheterization    Procedure: Left Heart Cath;  Surgeon: William Landis MD;  Location: [facility] CATH;  Service: Cardiology;;    Coronary angiography    Procedure: Coronary angiogram study;  Surgeon: William Landis MD;  Location: [facility] CATH;  Service: Cardiology;;    Eye surgery    cataracts    Hysterectomy    partial @ age 35     Ivus, coronary    Procedure: IVUS OM;  Surgeon: William Landis MD;  Location: [facility] CATH;  Service: Cardiology;;    Nose surgery    Stent, drug eluting, single vessel, coronary    Procedure: MARINA OM;  Surgeon: William Landis MD;  Location: [facility] CATH;  Service: Cardiology;;    Wrist surgery        No family history on file.  Social History     Tobacco Use    Smoking status: Former     Current packs/day: 0.00     Types: Cigarettes     Quit date: 2020     Years since quittin.3    Smokeless tobacco: Never    Tobacco comments:     Quit 24   Substance Use Topics    Alcohol use: No    Drug use: No         Physical Exam  /73   Pulse 82   Resp 18   Ht 5' 4" (1.626 m)   Wt 80 kg (176 lb 5.9 oz)   BMI 30.27 kg/m²   Body surface area is 1.9 meters squared.  Body mass index is 30.27 kg/m².    General appearance: alert, appears stated age, cooperative, and no distress  Head: Normocephalic, without obvious abnormality, atraumatic  Neck: no carotid bruit, no JVD, and supple, symmetrical, trachea midline  Lungs:  Decreased breath sounds bilaterally  Heart: regular rate and rhythm; S1, S2 normal, no murmur, click, rub or gallop  Abdomen: soft, non-tender, no distended  Extremities: extremities atraumatic, no pitting edema  Skin: warm, no cyanosis, no pathologic ecchymosis in exposed portions  Neurologic: Grossly normal. A&O x3      Labs Reviewed     Lab Results   Component Value Date    WBC 5.56 05/15/2024    HGB 10.5 (L) 05/15/2024     05/15/2024       Lab Results   Component Value Date     2024    K 4.2 2024     2024    CO2 28 2024    CALCIUM 9.5 2024 " "   MG 2.1 01/25/2024    GLU 85 05/22/2024    PROT 6.6 01/23/2024       Lab Results   Component Value Date    BUN 11 05/22/2024    BUN 9 05/15/2024    BUN 20 (H) 02/15/2024    CREATININE 0.59 05/22/2024    CREATININE 0.43 (L) 05/15/2024    CREATININE 0.46 (L) 02/15/2024    EGFRNORACEVR >60 05/22/2024    EGFRNORACEVR >60 05/15/2024    EGFRNORACEVR >60 02/15/2024       Lab Results   Component Value Date    ALBUMIN 3.7 01/23/2024    BILITOT 0.5 01/23/2024    ALKPHOS 78 01/23/2024    ALT 30 01/23/2024       Lab Results   Component Value Date    TRIG 97 11/12/2022    CHOL 147 11/12/2022    HDL 54 11/12/2022    LDLCALC 73.6 11/12/2022    LDLCALC 144.0 07/13/2021       No results found for: "HGBA1C", "TSH", "FREET4"    Lab Results   Component Value Date    NTPROBNP 420 05/22/2024    NTPROBNP 507 02/15/2024    NTPROBNP 2150 (H) 01/23/2024         EKG 1/23/24:  NSR, LAFB, PRWP            ASSESSMENT & PLAN:    This is a 78 y.o.  female, admitted earlier in the year for acute HFpEF and COPD exacerbation.  Currently euvolemic.  Estimated dry weight is 170-175 lb.  She is overweight and has COPD on home oxygen.  She appears euvolemic today.  She has made great strides clinically in terms of monitoring her sodium intake and being compliant with medications and I congratulated her for that.     1. S/P drug eluting coronary stent placement        2. Coronary artery disease involving native coronary artery of native heart without angina pectoris        3. Chronic heart failure with preserved ejection fraction (HFpEF)        4. Former heavy cigarette smoker (20-39 per day)        5. Mild aortic stenosis               Continue Dual antiplatelet therapy at least 6 months  Okay to hold clopidogrel for EBUS  Should not hold aspirin for any period of time   Start cardiac rehab   Continue torsemide 10 mg once daily and spironolactone 25 mg once daily   Continue atorvastatin 40 mg daily  Surveillance for aortic stenosis in " 2026  Emphasized the importance of modifying lifestyle related risk factors including  exercise, diet most resembling a Mediterranean diet.    I appreciate the opportunity to participate in Maci Moses 's care today.  Please follow up with me in 4-6 months.      William Landis MD, [facility]  Interventional Cardiology/Structural Heart Disease  [facility]  Office: [phone]            Parts of this note were completed using voice recognition software. Please excuse any misspellings or syntax errors and reach out to me with questions.

## 2024-06-24 ENCOUNTER — TELEPHONE (OUTPATIENT)
Dept: CARDIOLOGY | Facility: CLINIC | Age: 79
End: 2024-06-24
Payer: MEDICARE

## 2024-06-24 NOTE — TELEPHONE ENCOUNTER
----- Message from William Landis MD sent at 6/24/2024 12:29 PM CDT -----  Regarding: RE: Cardiac/ Medical Clearance  May hold clopidogrel but should resume ASAP.   May NOT hold aspirin for any duration of time.    May proceed without further cardiac testing.     William Landis MD  ----- Message -----  From: West, [place], MA  Sent: 6/24/2024  11:18 AM CDT  To: William Landis MD  Subject: FW: Cardiac/ Medical Clearance                   Hi Dr Landis,    Pt taking plavix and asa.  ----- Message -----  From: Lory Traylor  Sent: 6/24/2024  11:16 AM CDT  To: Boby Thomas Staff  Subject: Cardiac/ Medical Clearance                       Dr. Mike Carrion has scheduled the patient for a Robotic EBUS Bronchoscopy   on 7/11/24. We are requesting cardiac clearance and requesting the patient to hold the following Aspirin hold 5 days and Clopidogrel (Plavix) hold 5 days. Please fax over a clearance to our office at [phone] or place clearance in Epic and send back to me. Thanks for all your assistance with this patient and their care.

## 2024-06-24 NOTE — TELEPHONE ENCOUNTER
Message sent to karly with the following information    May hold clopidogrel but should resume ASAP.   May NOT hold aspirin for any duration of time.    May proceed without further cardiac testing.     William Landis MD

## 2024-07-05 PROBLEM — R91.8 MASS OF UPPER LOBE OF LEFT LUNG: Status: ACTIVE | Noted: 2024-07-05

## 2024-07-05 PROBLEM — R59.0 MEDIASTINAL LYMPHADENOPATHY: Status: ACTIVE | Noted: 2024-07-05

## 2024-07-16 PROBLEM — E66.09 CLASS 1 OBESITY DUE TO EXCESS CALORIES WITH SERIOUS COMORBIDITY AND BODY MASS INDEX (BMI) OF 30.0 TO 30.9 IN ADULT: Status: ACTIVE | Noted: 2024-07-16

## 2024-07-16 PROBLEM — E66.811 CLASS 1 OBESITY DUE TO EXCESS CALORIES WITH SERIOUS COMORBIDITY AND BODY MASS INDEX (BMI) OF 30.0 TO 30.9 IN ADULT: Status: ACTIVE | Noted: 2024-07-16

## 2024-07-16 PROBLEM — C34.92 SMALL CELL CARCINOMA OF LEFT LUNG: Status: ACTIVE | Noted: 2024-07-16

## 2024-07-18 ENCOUNTER — PATIENT MESSAGE (OUTPATIENT)
Dept: CARDIOLOGY | Facility: CLINIC | Age: 79
End: 2024-07-18
Payer: MEDICARE

## 2024-07-18 ENCOUNTER — TELEPHONE (OUTPATIENT)
Dept: CARDIOLOGY | Facility: CLINIC | Age: 79
End: 2024-07-18
Payer: MEDICARE

## 2024-07-18 NOTE — TELEPHONE ENCOUNTER
----- Message from Marina Copeland LPN sent at 7/17/2024  4:49 PM CDT -----    ----- Message -----  From: Lilliana Abreu  Sent: 7/17/2024   3:48 PM CDT  To: Boby Thomas Staff    Marbella is calling with Dr Urbina and is saying the pt needs cardiac clearance in order for a port to be placed. Please call back to advise, thank you    Marbella - [phone]

## 2024-07-22 ENCOUNTER — TELEPHONE (OUTPATIENT)
Dept: CARDIOLOGY | Facility: CLINIC | Age: 79
End: 2024-07-22
Payer: MEDICARE

## 2024-07-22 ENCOUNTER — PATIENT MESSAGE (OUTPATIENT)
Dept: CARDIOLOGY | Facility: CLINIC | Age: 79
End: 2024-07-22
Payer: MEDICARE

## 2024-07-22 NOTE — TELEPHONE ENCOUNTER
Pt needing CV risk and medication instructions for port placement with Dr Long for initiation of chemotherapy under general vs MAC anesthesia. Pt taking ASA 81 and plavix.     Fax: [phone]

## 2024-07-23 ENCOUNTER — TELEPHONE (OUTPATIENT)
Dept: CARDIOLOGY | Facility: CLINIC | Age: 79
End: 2024-07-23
Payer: MEDICARE

## 2024-07-23 NOTE — TELEPHONE ENCOUNTER
Spoke with Dr. Long' office and pt daughter regarding clearance for port-a-cath placement tomorrow. 7/23. Resent the clearance to Dr Landis for response.

## 2024-08-06 ENCOUNTER — DOCUMENTATION ONLY (OUTPATIENT)
Dept: ADMINISTRATIVE | Facility: OTHER | Age: 79
End: 2024-08-06
Payer: MEDICARE

## 2024-08-19 ENCOUNTER — PATIENT MESSAGE (OUTPATIENT)
Dept: CARDIOLOGY | Facility: CLINIC | Age: 79
End: 2024-08-19
Payer: MEDICARE

## 2024-08-20 NOTE — TELEPHONE ENCOUNTER
That is barely in the aneurysm range. I would not worry about this for the time being. We will re-image in 1-2 years.

## 2024-09-23 PROBLEM — I25.10 CORONARY ARTERY DISEASE INVOLVING NATIVE CORONARY ARTERY OF NATIVE HEART WITHOUT ANGINA PECTORIS: Status: ACTIVE | Noted: 2024-09-23

## 2024-09-23 PROBLEM — R55 SYNCOPE AND COLLAPSE: Status: ACTIVE | Noted: 2024-09-23

## 2024-09-23 PROBLEM — R53.0 NEOPLASTIC (MALIGNANT) RELATED FATIGUE: Status: ACTIVE | Noted: 2024-09-23

## 2024-09-23 PROBLEM — R63.0 ANOREXIA: Status: ACTIVE | Noted: 2024-09-23

## 2024-09-23 PROBLEM — J44.9 COPD (CHRONIC OBSTRUCTIVE PULMONARY DISEASE): Status: ACTIVE | Noted: 2024-09-23

## 2024-09-23 PROBLEM — T66.XXXA RADIATION ESOPHAGITIS: Status: ACTIVE | Noted: 2024-09-23

## 2024-09-23 PROBLEM — T45.1X5A CHEMOTHERAPY ADVERSE REACTION: Status: ACTIVE | Noted: 2024-09-23

## 2024-09-23 PROBLEM — K20.80 RADIATION ESOPHAGITIS: Status: ACTIVE | Noted: 2024-09-23

## 2024-09-24 PROBLEM — D62 ACUTE BLOOD LOSS ANEMIA: Status: ACTIVE | Noted: 2024-09-24

## 2024-09-25 PROBLEM — Z73.6 LIMITATION OF ACTIVITY DUE TO DISABILITY: Status: ACTIVE | Noted: 2024-09-25

## 2024-09-26 PROBLEM — D64.9 SYMPTOMATIC ANEMIA: Status: ACTIVE | Noted: 2024-09-26

## 2024-11-21 ENCOUNTER — LAB VISIT (OUTPATIENT)
Dept: LAB | Facility: HOSPITAL | Age: 79
End: 2024-11-21
Attending: INTERNAL MEDICINE
Payer: MEDICARE

## 2024-11-21 ENCOUNTER — OFFICE VISIT (OUTPATIENT)
Dept: CARDIOLOGY | Facility: CLINIC | Age: 79
End: 2024-11-21
Payer: MEDICARE

## 2024-11-21 VITALS
WEIGHT: 175.69 LBS | HEART RATE: 132 BPM | BODY MASS INDEX: 32.33 KG/M2 | HEIGHT: 62 IN | SYSTOLIC BLOOD PRESSURE: 134 MMHG | DIASTOLIC BLOOD PRESSURE: 79 MMHG

## 2024-11-21 DIAGNOSIS — C34.12 SMALL CELL CARCINOMA OF UPPER LOBE OF LEFT LUNG: ICD-10-CM

## 2024-11-21 DIAGNOSIS — Z87.891 FORMER HEAVY CIGARETTE SMOKER (20-39 PER DAY): Primary | ICD-10-CM

## 2024-11-21 DIAGNOSIS — I50.32 CHRONIC HEART FAILURE WITH PRESERVED EJECTION FRACTION (HFPEF): ICD-10-CM

## 2024-11-21 DIAGNOSIS — Z95.5 S/P DRUG ELUTING CORONARY STENT PLACEMENT: ICD-10-CM

## 2024-11-21 DIAGNOSIS — I35.0 MILD AORTIC STENOSIS: ICD-10-CM

## 2024-11-21 DIAGNOSIS — R00.0 SINUS TACHYCARDIA: ICD-10-CM

## 2024-11-21 DIAGNOSIS — I25.10 CORONARY ARTERY DISEASE INVOLVING NATIVE CORONARY ARTERY OF NATIVE HEART WITHOUT ANGINA PECTORIS: ICD-10-CM

## 2024-11-21 LAB
ANION GAP SERPL CALC-SCNC: 12 MMOL/L (ref 8–16)
BASOPHILS # BLD AUTO: 0.05 K/UL (ref 0–0.2)
BASOPHILS NFR BLD: 0.8 % (ref 0–1.9)
BUN SERPL-MCNC: 17 MG/DL (ref 8–23)
CALCIUM SERPL-MCNC: 10 MG/DL (ref 8.7–10.5)
CHLORIDE SERPL-SCNC: 106 MMOL/L (ref 95–110)
CO2 SERPL-SCNC: 25 MMOL/L (ref 23–29)
CREAT SERPL-MCNC: 0.6 MG/DL (ref 0.5–1.4)
DIFFERENTIAL METHOD BLD: ABNORMAL
EOSINOPHIL # BLD AUTO: 0.1 K/UL (ref 0–0.5)
EOSINOPHIL NFR BLD: 1 % (ref 0–8)
ERYTHROCYTE [DISTWIDTH] IN BLOOD BY AUTOMATED COUNT: 17.4 % (ref 11.5–14.5)
EST. GFR  (NO RACE VARIABLE): >60 ML/MIN/1.73 M^2
GLUCOSE SERPL-MCNC: 98 MG/DL (ref 70–110)
HCT VFR BLD AUTO: 33.7 % (ref 37–48.5)
HGB BLD-MCNC: 10 G/DL (ref 12–16)
IMM GRANULOCYTES # BLD AUTO: 0.01 K/UL (ref 0–0.04)
IMM GRANULOCYTES NFR BLD AUTO: 0.2 % (ref 0–0.5)
LYMPHOCYTES # BLD AUTO: 0.7 K/UL (ref 1–4.8)
LYMPHOCYTES NFR BLD: 11 % (ref 18–48)
MCH RBC QN AUTO: 29.9 PG (ref 27–31)
MCHC RBC AUTO-ENTMCNC: 29.7 G/DL (ref 32–36)
MCV RBC AUTO: 101 FL (ref 82–98)
MONOCYTES # BLD AUTO: 0.7 K/UL (ref 0.3–1)
MONOCYTES NFR BLD: 11.7 % (ref 4–15)
NEUTROPHILS # BLD AUTO: 4.5 K/UL (ref 1.8–7.7)
NEUTROPHILS NFR BLD: 75.3 % (ref 38–73)
NRBC BLD-RTO: 0 /100 WBC
OHS QRS DURATION: 90 MS
OHS QTC CALCULATION: 445 MS
PLATELET # BLD AUTO: 436 K/UL (ref 150–450)
PMV BLD AUTO: 10.1 FL (ref 9.2–12.9)
POTASSIUM SERPL-SCNC: 4.7 MMOL/L (ref 3.5–5.1)
RBC # BLD AUTO: 3.35 M/UL (ref 4–5.4)
SODIUM SERPL-SCNC: 143 MMOL/L (ref 136–145)
WBC # BLD AUTO: 6 K/UL (ref 3.9–12.7)

## 2024-11-21 PROCEDURE — 99999 PR PBB SHADOW E&M-EST. PATIENT-LVL III: CPT | Mod: PBBFAC,,, | Performed by: INTERNAL MEDICINE

## 2024-11-21 PROCEDURE — 80048 BASIC METABOLIC PNL TOTAL CA: CPT | Performed by: INTERNAL MEDICINE

## 2024-11-21 PROCEDURE — 93005 ELECTROCARDIOGRAM TRACING: CPT | Mod: PO

## 2024-11-21 PROCEDURE — 85025 COMPLETE CBC W/AUTO DIFF WBC: CPT | Performed by: INTERNAL MEDICINE

## 2024-11-21 PROCEDURE — 36415 COLL VENOUS BLD VENIPUNCTURE: CPT | Mod: PO | Performed by: INTERNAL MEDICINE

## 2024-11-21 NOTE — PROGRESS NOTES
[facility]   Cardiology Clinic Note  Date: 11/21/24    Patient: Maci Moses, 1945, [id number]  Primary Care Provider: Verona Dupont MD     Chief Complaint/Reason for Referral: CHF    Subjective       History of Present Illness    Ms. Moses presents today for follow-up after cancer treatment.    She reports feeling good overall. She has been diagnosed with stage three small cell lung cancer and has undergone chemotherapy and radiation treatment. A follow-up PET scan is scheduled for December 15th to assess treatment efficacy. She experienced delayed onset side effects from radiation, including dehydration of the esophagus, which led to difficulty eating and drinking. This resulted in significant weakness, causing her to pass out once and requiring a five-day hospitalization. She reports pain with swallowing during this period.    She required a blood transfusion due to anemia, receiving one pint of blood. Her blood counts were checked approximately 2-3 weeks ago at [facility], showing improvement with levels within the expected range for someone undergoing chemotherapy.    She has a history of stent placement more than six months ago. She reports occasional dizziness, particularly when transitioning from lying to standing position, and notes a rapid heart rate. She denies any worsening in her breathing compared to her baseline.    She reports improved breathing compared to her baseline and denies any worsening of respiratory symptoms. She confirms using oxygen only at night, set at 2 L per minute at most. Her SpO2 levels have increased, ranging from 97% to 99% when checked.    She is discontinuing Plavix and spironolactone. She will continue taking aspirin, atorvastatin, and Torsemide. She reports dizziness when getting up from a lying position. She is instructed to take a daily low-dose aspirin without interruption.      ROS:  10-system ROS is negative unless otherwise indicated in  the HPI.            Focused Past History includes:  Two week monitor March 2024 by report:  8% PVCs.  Twelve PVC in 2 runs of nonsustained VT  TTE March 2024: EF 50-55%.  Mild aortic stenosis with peak velocity 1.9, mean gradient 8, DI 0.62.  Angiogram 05/15/2024:  PCI to large OM1 with single MARINA.  LORENA SCLC s/p chemotherapy and radiation  Obesity  COPD - she is scheduled to have PFTs next few months  Recently former smoker. 50-60 pack-years  No history of, stroke/TIA, MI, coronary revascularization, diabetes or pathologic bleeding      Current Outpatient Medications   Medication Sig    acetaminophen (TYLENOL) 500 MG tablet Take 500 mg by mouth as needed for Pain (1-5). Indications: pain, 1-5    albuterol (PROVENTIL/VENTOLIN HFA) 90 mcg/actuation inhaler Inhale 2 puffs into the lungs every 6 (six) hours as needed for Wheezing. Rescue    aspirin 81 mg Cap Take 81 mg by mouth once daily. Indications: stroke prevention    atorvastatin (LIPITOR) 40 MG tablet Take 1 tablet (40 mg total) by mouth once daily.    dexAMETHasone (DECADRON) 4 MG Tab Take by mouth.    fluticasone-umeclidin-vilanter (TRELEGY ELLIPTA) 100-62.5-25 mcg DsDv Inhale 1 puff into the lungs once daily.    loratadine-pseudoephedrine  mg (CLARITIN-D 24 HOUR)  mg per 24 hr tablet Take 1 tablet by mouth daily as needed for Allergies. Indications: inflammation of the nose due to an allergy    OXYGEN-AIR DELIVERY SYSTEMS MISC 2 L by Nasal route continuous. Indications: worsening chronic obstructive pulmonary disease    pantoprazole (PROTONIX) 40 MG tablet Take 1 tablet (40 mg total) by mouth 2 (two) times daily.    prochlorperazine (COMPAZINE) 10 MG tablet Take by mouth.    torsemide (DEMADEX) 10 MG Tab Take 10 mg by mouth.    polyethylene glycol (GLYCOLAX) 17 gram PwPk Take 17 g by mouth Daily. Indications: constipation (Patient not taking: Reported on 11/21/2024)     No current facility-administered medications for this visit.                Review of Systems  Constitutional: negative for fevers, night sweats, and weight loss  Eyes: negative for visual disturbance, diplopia  Respiratory: negative for cough, hemoptysis, sputum, and wheezing  Cardiovascular: see HPI  Gastrointestinal: negative for abdominal pain, bright red blood per rectum, change in bowel habits, dysphagia, melena, and reflux symptoms  Genitourinary:negative for dysuria, frequency, and hematuria  Hematologic/lymphatic: negative for bleeding, easy bruising, and lymphadenopathy  Musculoskeletal:negative for arthralgias, back pain, and myalgias  Neurological: negative for gait problems, paresthesia, speech problems, vertigo, and weakness  Behavioral/Psych: negative for excessive alcohol consumption, illegal drug usage, and sleep disturbance    -------------------------------------    COPD (chronic obstructive pulmonary disease)    Coronary artery disease    Hyperlipidemia    Malignant neoplasm of lung, unspecified laterality, unspecified part of lung    PVC (premature ventricular contraction)    Requires supplemental oxygen    Continuous O2 @2L per NC at HS    Sleep apnea    non compliant with CPAP     ----------------------------    Angiogram, coronary, with left heart catheterization    Procedure: Left Heart Cath;  Surgeon: William Landis MD;  Location: [facility] CATH;  Service: Cardiology;;    Coronary angiography    Procedure: Coronary angiogram study;  Surgeon: William Landis MD;  Location: [facility] CATH;  Service: Cardiology;;    Endobronchial ultrasound    Procedure: ENDOBRONCHIAL ULTRASOUND (EBUS);  Surgeon: Nicko Michaels Jr., DO;  Location: [facility] OR;  Service: Pulmonary;  Laterality: Bilateral;    Esophagogastroduodenoscopy    Procedure: EGD (ESOPHAGOGASTRODUODENOSCOPY);  Surgeon: Charli Barry MD;  Location: [facility] ENDO;  Service: Endoscopy;  Laterality: N/A;    Eye surgery    cataracts    Hysterectomy    partial @ age 35     Insertion of tunneled central venous catheter (cvc) with  "subcutaneous port    Procedure: [id number];  Surgeon: Ciro Long MD;  Location: [facility];  Service: General;  Laterality: Right;    Ivus, coronary    Procedure: IVUS OM;  Surgeon: William Landis MD;  Location: [facility] CATH;  Service: Cardiology;;    Nose surgery    Robotic bronchoscopy    Procedure: ROBOTIC BRONCHOSCOPY;  Surgeon: Nicko Michaels Jr., DO;  Location: [facility] OR;  Service: Pulmonary;  Laterality: Bilateral;    Stent, drug eluting, single vessel, coronary    Procedure: MARINA OM;  Surgeon: William Landis MD;  Location: [facility] CATH;  Service: Cardiology;;    Wrist surgery        Family History   Adopted: Yes     Social History     Tobacco Use    Smoking status: Former     Current packs/day: 0.00     Types: Cigarettes     Quit date: 2020     Years since quittin.8    Smokeless tobacco: Never    Tobacco comments:     Quit 24   Substance Use Topics    Alcohol use: No    Drug use: No         Physical Exam  /79   Pulse (!) 132   Ht 5' 2" (1.575 m)   Wt 79.7 kg (175 lb 11.3 oz)   BMI 32.14 kg/m²   Body surface area is 1.87 meters squared.  Body mass index is 32.14 kg/m².    General appearance: alert, appears stated age, cooperative, and no distress  Head: Normocephalic, without obvious abnormality, atraumatic  Neck: no carotid bruit, no JVD, and supple, symmetrical, trachea midline  Lungs:  Decreased breath sounds bilaterally  Heart: regular rate and rhythm; S1, S2 normal, no murmur, click, rub or gallop  Abdomen: soft, non-tender, no distended  Extremities: extremities atraumatic, no pitting edema  Skin: warm, no cyanosis, no pathologic ecchymosis in exposed portions  Neurologic: Grossly normal. A&O x3      Labs Reviewed     Lab Results   Component Value Date    WBC 2.08 (L) 2024    HGB 8.2 (L) 2024     2024    INR 1.1 2024    APTT 32.1 2024       Lab Results   Component Value Date     (L) 2024    K 3.7 2024     2024    " "CO2 26 09/26/2024    CALCIUM 8.4 09/26/2024    MG 1.8 09/26/2024    GLU 94 09/26/2024    PROT 5.6 (L) 09/26/2024       Lab Results   Component Value Date    BUN 6 (L) 09/26/2024    BUN 9 09/25/2024    BUN 16 09/24/2024    CREATININE 0.46 (L) 09/26/2024    CREATININE 0.44 (L) 09/25/2024    CREATININE 0.53 09/24/2024    EGFRNORACEVR >60 09/26/2024    EGFRNORACEVR >60 09/25/2024    EGFRNORACEVR >60 09/24/2024       Lab Results   Component Value Date    ALBUMIN 2.9 (L) 09/26/2024    BILITOT 0.5 09/26/2024    ALKPHOS 84 09/26/2024    ALT 14 09/26/2024       Lab Results   Component Value Date    TRIG 103 07/13/2024    CHOL 134 07/13/2024    HDL 56 07/13/2024    LDLCALC 57.4 (L) 07/13/2024    LDLCALC 73.6 11/12/2022       No results found for: "HGBA1C", "TSH", "FREET4"    Lab Results   Component Value Date    NTPROBNP 537 09/23/2024    NTPROBNP 420 05/22/2024    NTPROBNP 507 02/15/2024         EKG 1/23/24:  NSR, LAFB, PRWP\    EKG today: sinus tachycardia, PVCs.             ASSESSMENT & PLAN:    1. Former heavy cigarette smoker (20-39 per day)  IN OFFICE EKG 12-LEAD (to Muse)      2. Mild aortic stenosis  IN OFFICE EKG 12-LEAD (to Muse)      3. Chronic heart failure with preserved ejection fraction (HFpEF)  IN OFFICE EKG 12-LEAD (to Muse)      4. S/P drug eluting coronary stent placement  IN OFFICE EKG 12-LEAD (to [place])      5. Coronary artery disease involving native coronary artery of native heart without angina pectoris  IN OFFICE EKG 12-LEAD (to Muse)      6. Small cell carcinoma of upper lobe of left lung  IN OFFICE EKG 12-LEAD (to Muse)      7. Sinus tachycardia  CBC Auto Differential    Basic metabolic panel          Assessment & Plan    IMPRESSION:   Assessed post-cancer treatment status: small cell lung cancer, stage 3, completed chemo and radiation   Evaluated anemia status; recent blood counts within expected range post-chemo   Discontinued Plavix due to anemia risk and >6 months post-stent placement   " Maintained aspirin therapy for cardiovascular protection   Continued atorvastatin for ongoing lipid management   Discontinued spironolactone after reviewing kidney function   Considered risks associated with cancer: dehydration, anemia, and potential clot formation   Noted improved breathing and SpO2, no current need for daytime oxygen     Explained stent longevity and potential failure mechanisms (clotting, plaque buildup).   Discussed reasons for not routinely rechecking stents, relying on symptom presentation instead.   Informed about delayed effects of radiation therapy on esophageal tissue.   Discontinued Plavix.   Continued aspirin (low-dose) daily.   Continued atorvastatin.   Discontinued spironolactone.   Continued Torsemide; advised to skip 1-2 days if dehydrated, vomiting, or having diarrhea during potential future chemotherapy.   Ordered complete blood count, electrolytes, and kidney function.   Ordered pulse oximetry check.   Follow up after PET scan scheduled for December 15th.               I appreciate the opportunity to participate in Maci Moses 's care today.  Please follow up with me in 4-6 months.      William Landis MD, [facility]  Interventional Cardiology/Structural Heart Disease  [facility]  Office: [phone]            Parts of this note were completed using voice recognition software. Please excuse any misspellings or syntax errors and reach out to me with questions.

## 2024-11-22 ENCOUNTER — PATIENT MESSAGE (OUTPATIENT)
Dept: CARDIOLOGY | Facility: CLINIC | Age: 79
End: 2024-11-22
Payer: MEDICARE

## 2025-02-23 ENCOUNTER — PATIENT MESSAGE (OUTPATIENT)
Dept: CARDIOLOGY | Facility: CLINIC | Age: 80
End: 2025-02-23
Payer: MEDICARE

## 2025-03-11 ENCOUNTER — OFFICE VISIT (OUTPATIENT)
Dept: CARDIOLOGY | Facility: CLINIC | Age: 80
End: 2025-03-11
Payer: MEDICARE

## 2025-03-11 VITALS
HEIGHT: 70 IN | DIASTOLIC BLOOD PRESSURE: 77 MMHG | SYSTOLIC BLOOD PRESSURE: 149 MMHG | BODY MASS INDEX: 31.78 KG/M2 | WEIGHT: 222 LBS | HEART RATE: 77 BPM

## 2025-03-11 DIAGNOSIS — I50.32 CHRONIC HEART FAILURE WITH PRESERVED EJECTION FRACTION (HFPEF): ICD-10-CM

## 2025-03-11 DIAGNOSIS — E66.812 CLASS 2 SEVERE OBESITY DUE TO EXCESS CALORIES WITH SERIOUS COMORBIDITY AND BODY MASS INDEX (BMI) OF 35.0 TO 35.9 IN ADULT: ICD-10-CM

## 2025-03-11 DIAGNOSIS — Z95.5 S/P DRUG ELUTING CORONARY STENT PLACEMENT: ICD-10-CM

## 2025-03-11 DIAGNOSIS — C34.12 SMALL CELL CARCINOMA OF UPPER LOBE OF LEFT LUNG: ICD-10-CM

## 2025-03-11 DIAGNOSIS — Z87.891 FORMER HEAVY CIGARETTE SMOKER (20-39 PER DAY): ICD-10-CM

## 2025-03-11 DIAGNOSIS — J43.9 PULMONARY EMPHYSEMA, UNSPECIFIED EMPHYSEMA TYPE: ICD-10-CM

## 2025-03-11 DIAGNOSIS — E66.01 CLASS 2 SEVERE OBESITY DUE TO EXCESS CALORIES WITH SERIOUS COMORBIDITY AND BODY MASS INDEX (BMI) OF 35.0 TO 35.9 IN ADULT: ICD-10-CM

## 2025-03-11 DIAGNOSIS — G93.41 ACUTE METABOLIC ENCEPHALOPATHY: ICD-10-CM

## 2025-03-11 DIAGNOSIS — I73.9 PERIPHERAL VASCULAR DISEASE, UNSPECIFIED: ICD-10-CM

## 2025-03-11 DIAGNOSIS — I25.10 CORONARY ARTERY DISEASE INVOLVING NATIVE CORONARY ARTERY OF NATIVE HEART WITHOUT ANGINA PECTORIS: ICD-10-CM

## 2025-03-11 DIAGNOSIS — I50.9 NEW ONSET OF CONGESTIVE HEART FAILURE: ICD-10-CM

## 2025-03-11 DIAGNOSIS — Z91.89 FRAMINGHAM CARDIAC RISK 10-20% IN NEXT 10 YEARS: ICD-10-CM

## 2025-03-11 DIAGNOSIS — I47.29 NSVT (NONSUSTAINED VENTRICULAR TACHYCARDIA): Primary | ICD-10-CM

## 2025-03-11 DIAGNOSIS — E78.2 MIXED HYPERLIPIDEMIA: ICD-10-CM

## 2025-03-11 PROCEDURE — 3288F FALL RISK ASSESSMENT DOCD: CPT | Mod: CPTII,S$GLB,, | Performed by: INTERNAL MEDICINE

## 2025-03-11 PROCEDURE — 99214 OFFICE O/P EST MOD 30 MIN: CPT | Mod: S$GLB,,, | Performed by: INTERNAL MEDICINE

## 2025-03-11 PROCEDURE — 3077F SYST BP >= 140 MM HG: CPT | Mod: CPTII,S$GLB,, | Performed by: INTERNAL MEDICINE

## 2025-03-11 PROCEDURE — 99999 PR PBB SHADOW E&M-EST. PATIENT-LVL III: CPT | Mod: PBBFAC,,, | Performed by: INTERNAL MEDICINE

## 2025-03-11 PROCEDURE — 1101F PT FALLS ASSESS-DOCD LE1/YR: CPT | Mod: CPTII,S$GLB,, | Performed by: INTERNAL MEDICINE

## 2025-03-11 PROCEDURE — 1159F MED LIST DOCD IN RCRD: CPT | Mod: CPTII,S$GLB,, | Performed by: INTERNAL MEDICINE

## 2025-03-11 PROCEDURE — 3078F DIAST BP <80 MM HG: CPT | Mod: CPTII,S$GLB,, | Performed by: INTERNAL MEDICINE

## 2025-03-11 PROCEDURE — 1126F AMNT PAIN NOTED NONE PRSNT: CPT | Mod: CPTII,S$GLB,, | Performed by: INTERNAL MEDICINE

## 2025-03-11 PROCEDURE — 1160F RVW MEDS BY RX/DR IN RCRD: CPT | Mod: CPTII,S$GLB,, | Performed by: INTERNAL MEDICINE

## 2025-03-11 NOTE — PROGRESS NOTES
Subjective:    Patient ID:  Maci Moses is a 79 y.o. female patient here for evaluation Follow-up      History of Present Illness:  Cardiology follow-up.  Known coronary disease status post PCI stent, 05/2024.  Echo with mild AS EF 55% Obtuse marginal.  concomitant problems of ongoing treatment for small-cell carcinoma lung, status post radiation therapy no ongoing immunotherapy.  Overall doing well.  Denies angina dyspnea.  No cough.          Focused Past History includes:  Two week monitor March 2024 by report:  8% PVCs.  Twelve PVC in 2 runs of nonsustained VT  TTE March 2024: EF 50-55%.  Mild aortic stenosis with peak velocity 1.9, mean gradient 8, DI 0.62.  Angiogram 05/15/2024:  PCI to large OM1 with single MARINA.  LORENA SCLC s/p chemotherapy and radiation  Obesity  COPD - she is scheduled to have PFTs next few months  Recently former smoker. 50-60 pack-years  No history of, stroke/TIA, MI, coronary revascularization, diabetes or pathologic bleeding      Review of patient's allergies indicates:   Allergen Reactions    Adhesive Rash    Adhesive Rash     Band-aids       Past Medical History:   Diagnosis Date    COPD (chronic obstructive pulmonary disease)     Coronary artery disease     Hyperlipidemia     Malignant neoplasm of lung, unspecified laterality, unspecified part of lung     PVC (premature ventricular contraction)     Requires supplemental oxygen     Continuous O2 @2L per NC at HS    Sleep apnea     non compliant with CPAP     Past Surgical History:   Procedure Laterality Date    ANGIOGRAM, CORONARY, WITH LEFT HEART CATHETERIZATION  5/15/2024    Procedure: Left Heart Cath;  Surgeon: William Landis MD;  Location: [facility] CATH;  Service: Cardiology;;    CORONARY ANGIOGRAPHY  5/15/2024    Procedure: Coronary angiogram study;  Surgeon: William Landis MD;  Location: [facility] CATH;  Service: Cardiology;;    ENDOBRONCHIAL ULTRASOUND Bilateral 7/5/2024    Procedure: ENDOBRONCHIAL ULTRASOUND (EBUS);  Surgeon: Nicko Michaels  JO King DO;  Location: [facility] OR;  Service: Pulmonary;  Laterality: Bilateral;    ESOPHAGOGASTRODUODENOSCOPY N/A 9/25/2024    Procedure: EGD (ESOPHAGOGASTRODUODENOSCOPY);  Surgeon: Charli Barry MD;  Location: [facility] ENDO;  Service: Endoscopy;  Laterality: N/A;    EYE SURGERY Bilateral     cataracts    HYSTERECTOMY      partial @ age 35     INSERTION OF TUNNELED CENTRAL VENOUS CATHETER (CVC) WITH SUBCUTANEOUS PORT Right 7/24/2024    Procedure: [id number];  Surgeon: Ciro Long MD;  Location: [facility] CSC;  Service: General;  Laterality: Right;    IVUS, CORONARY  5/15/2024    Procedure: IVUS OM;  Surgeon: William Landis MD;  Location: [facility] CATH;  Service: Cardiology;;    NOSE SURGERY      ROBOTIC BRONCHOSCOPY Bilateral 7/5/2024    Procedure: ROBOTIC BRONCHOSCOPY;  Surgeon: Nicko Michaels Jr., DO;  Location: [facility] OR;  Service: Pulmonary;  Laterality: Bilateral;    STENT, DRUG ELUTING, SINGLE VESSEL, CORONARY  5/15/2024    Procedure: MARINA OM;  Surgeon: William Landis MD;  Location: [facility] CATH;  Service: Cardiology;;    WRIST SURGERY       Social History[1]     Review of Systems:    As noted in HPI in addition         REVIEW OF SYSTEMS  Review of Systems   Constitutional: Negative for decreased appetite, diaphoresis, night sweats, weight gain and weight loss.   HENT:  Negative for nosebleeds and odynophagia.    Eyes:  Negative for double vision and photophobia.   Cardiovascular:  Negative for chest pain, claudication, cyanosis, dyspnea on exertion, irregular heartbeat, leg swelling, near-syncope, orthopnea, palpitations, paroxysmal nocturnal dyspnea and syncope.   Respiratory:  Negative for cough, hemoptysis, shortness of breath and wheezing.    Hematologic/Lymphatic: Negative for adenopathy.   Skin:  Negative for flushing, skin cancer and suspicious lesions.   Musculoskeletal:  Negative for gout, myalgias and neck pain.   Gastrointestinal:  Negative for abdominal pain, heartburn, hematemesis and hematochezia.    Genitourinary:  Negative for bladder incontinence, hesitancy and nocturia.   Neurological:  Negative for focal weakness, headaches, light-headedness and paresthesias.   Psychiatric/Behavioral:  Negative for memory loss and substance abuse.        Objective:        Vitals:    03/11/25 1414   BP: (!) 149/77   Pulse: 77       Lab Results   Component Value Date    WBC 6.00 11/21/2024    HGB 10.0 (L) 11/21/2024    HCT 33.7 (L) 11/21/2024     11/21/2024    CHOL 134 07/13/2024    TRIG 103 07/13/2024    HDL 56 07/13/2024    ALT 14 09/26/2024    AST 21 09/26/2024     11/21/2024    K 4.7 11/21/2024     11/21/2024    CREATININE 0.6 11/21/2024    BUN 17 11/21/2024    CO2 25 11/21/2024    INR 1.1 07/24/2024      CARDIOGRAM RESULTS  Results for orders placed during the hospital encounter of 03/28/24    Echo    Interpretation Summary    Left Ventricle: The left ventricle is mildly dilated. There is mild eccentric hypertrophy. There is low normal systolic function with a visually estimated ejection fraction of 50 - 55%. Grade I diastolic dysfunction. Elevated left ventricular filling pressure.    Right Ventricle: Normal right ventricular cavity size. Systolic function is normal.    Aortic Valve: The aortic valve is a trileaflet valve. There is mild stenosis. Aortic valve area by VTI is 1.88 cm². Aortic valve peak velocity is 1.90 m/s. Mean gradient is 8 mmHg. The dimensionless index is 0.62.    IVC/SVC: Normal venous pressure at 3 mmHg.    Results for orders placed during the hospital encounter of 05/15/24    Cardiac catheterization    Conclusion  Procedures:  Moderate sedation  Left heart cath  Coronary angiogram  IVUS of OM  Coronary angioplasty with stenting of OM    Conclusions:  95% stenosis of the large OM1 status post successful IVUS guided PCI with single MARINA  Large burden of nonobstructive disease  Mildly elevated left filling pressure    Recommendations:  Dual antiplatelet therapy for at least 6  months  High-intensity statin  Cardiac rehab  Switch furosemide to torsemide 10 mg once daily plus spironolactone 25 mg once daily  Follow up as currently scheduled          Description of procedure:  The patient presented to the Cath Lab in a(n) elective fashion.  The patient was prepped and draped in a sterile manner.  Timeout, airway and ASA assessment were completed.  Local anesthesia with 2% lidocaine was administered and sedation/analgesia were administered as above.    Access was obtained using the modified Seldinger technique and a micropuncture needle with ultrasound guidance in the right radial artery. Diagnostic angiography was performed using JL3.5 and JR4.0 catheter(s).      Coronary anatomy:  Dominance: right  Left main:  Mild diffuse disease  Left anterior descending:  Large vessel with mild diffuse disease.  It supplies the entire apex.  The diagonals are small in size in general  Left circumflex:  Large tortuous vessel with mild diffuse disease.  OM1 is a large vessel with a discrete 95% stenosis with moderate calcification and DENNISE 3 flow.  OM2 is small.  Right coronary artery:  Large dominant vessel with mild diffuse disease.  There is an eccentric 40-50% stenosis proximally.  There is a large RPDA and 2 large RPL branches.  RPL 2 is bifurcating.      Percutaneous coronary intervention:  Anticoagulation: IA/IV unfractionated heparin with ACT>250s. DAPT status confirmed.  Guiding catheter:  6 [place] CLS 3.5    The lesion was quite difficult to wire due to its severity and more importantly proximal tortuosity that was by single wire away from the lesion.  I was eventually able to cross the lesion using a Aristeo Black inside a 90-degree angled Supercross microcatheter.  This was then exchanged to a workhorse wire  The lesion was ballooned with a 2.5 NC balloon with good expansion.  IVUS was then performed revealing only moderate calcification  The lesion was stented with a 3.0 x 16 Synergy XD MARINA post  dilated with 3.0 NC balloon at high pressure after repeat IVUS.  There was 0% residual stenosis and DENNISE 3 flow at the end of procedure.      Hemodynamics:  /15        Hemostasis:  TR band        Complications:  None  Estimated blood loss:  Minimal      The patient tolerated the procedure well and left the cath lab in stable condition.      William Landis MD, [facility]  Interventional Cardiology/Structural Heart Disease  [facility]  Office: [phone]    The clinically important portion of this report has been dictated in the section above. Our Epic reporting system does not allow us to provide a clinically meaningful report without this dictation. Please beware that there may be errors and discrepancies in the computer transcription and all of the clicks required to finalize the report.  The procedure log was documented by Documenter: Mary Moore RCS and verified by William Landis MD.    Date: 5/15/2024  Time: 11:26 AM          CURRENT/PREVIOUS VISIT EKG  Results for orders placed or performed in visit on 11/21/24   IN OFFICE EKG 12-LEAD (to [place])    Collection Time: 11/21/24 12:50 PM   Result Value Ref Range    QRS Duration 90 ms    [facility] QTC Calculation 445 ms    Narrative    Test Reason : Z87.891,I35.0,I50.32,Z95.5,I25.10,C34.12,    Vent. Rate : 121 BPM     Atrial Rate : 121 BPM     P-R Int : 170 ms          QRS Dur :  90 ms      QT Int : 314 ms       P-R-T Axes :  42  16  75 degrees    QTcB Int : 445 ms    Sinus tachycardia with frequent Premature ventricular complexes  Otherwise normal ECG    Confirmed by William Landis ([id number]) on 11/21/2024 5:08:36 PM    Referred By: DARRICK R           Confirmed By: iWlliam Landis     No valid procedures specified.   Results for orders placed during the hospital encounter of 05/01/24    Nuclear Stress - Cardiology Interpreted    Interpretation Summary    A total regadenoson dose of 0.4 mg was injected.    Abnormal myocardial perfusion scan.     There is a moderate intensity, moderate sized, equivocal perfusion abnormality that is fixed in the basal to distal anterior wall(s). This finding is equivocal due to a breast shadow overlying the myocardium.    There are no other significant perfusion abnormalities.    The gated perfusion images showed an ejection fraction of 49% post stress.    LV cavity size is mildly enlarged at rest and mildly enlarged at stress.    The ECG portion of the study is abnormal but not diagnostic.    The patient reported no chest pain during the stress test.    During stress, frequent PVCs are noted.    No valid procedures specified.        PHYSICAL EXAM  GENERAL: well built, well nourished, well-developed in no apparent distress alert and oriented.   HEENT: Normocephalic. Pupils normal and conjunctivae normal.  Mucous membranes normal, no cyanosis or icterus, trachea central,no pallor or icterus is noted..   NECK: No JVD. No bruit..   THYROID: Thyroid not enlarged. No nodules present..   CARDIAC:  Normal S1-S2.  Grade 1-2/6 crescendo decrescendo murmur aortic area.    LUNGS: Clear to auscultation.  Mildly decreased breath sounds bilaterally.  ABDOMEN: Soft no masses or organomegaly.  No abdomen pulsations or bruits.  Normal bowel sounds. No pulsations and no masses felt, No guarding or rebound.   URINARY: No garibay catheter   EXTREMITIES: No cyanosis, clubbing or edema noted at this time., no calf tenderness bilaterally.   PERIPHERAL VASCULAR SYSTEM:  +1 distal pulses.  2+ femoral, popliteal and   No bruits    CENTRAL NERVOUS SYSTEM: No focal motor or sensory deficits noted.   SKIN: Skin without lesions, moist, well perfused.   MUSCLE STRENGTH & TONE: No noteable weakness, atrophy or abnormal movement.     I HAVE REVIEWED :    The vital signs, nurses notes, and all the pertinent radiology and labs.         Current Outpatient Medications   Medication Instructions    acetaminophen (TYLENOL) 500 mg, As needed (PRN)    albuterol  (PROVENTIL/VENTOLIN HFA) 90 mcg/actuation inhaler 2 puffs, Inhalation, Every 6 hours PRN, Rescue    aspirin 81 mg, Daily    atorvastatin (LIPITOR) 40 mg, Oral, Daily    dexAMETHasone (DECADRON) 4 MG Tab Take by mouth.    fluticasone-umeclidin-vilanter (TRELEGY ELLIPTA) 100-62.5-25 mcg DsDv 1 puff, Inhalation    loratadine-pseudoephedrine  mg (CLARITIN-D 24 HOUR)  mg per 24 hr tablet 1 tablet, Daily PRN    OXYGEN-AIR DELIVERY SYSTEMS MISC 2 L, Continuous    pantoprazole (PROTONIX) 40 mg, Oral, 2 times daily    polyethylene glycol (GLYCOLAX) 17 g, Daily    prochlorperazine (COMPAZINE) 10 MG tablet Take by mouth.    torsemide (DEMADEX) 10 mg          Assessment:     1.  Former heavy cigarette smoker (20-39 per day)        2.  Mild aortic stenosis   I     3.  Chronic heart failure with preserved ejection fraction (HFpEF)        4.  S/P drug eluting coronary stent placement        5.  Coronary artery disease involving native coronary artery of native heart without angina pectoris        6.  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     7.  Sinus tachycardia   CBC Auto Differential     Basic metabolic panel       Plan:   Continue aspirin 81.  Atorvastatin.  P.r.n. torsemide    Imaging, labs follow up with Hematology-Oncology.    Six-month, update echo call results          No follow-ups on file.            [1]   Social History  Tobacco Use    Smoking status: Former     Current packs/day: 0.00     Types: Cigarettes     Quit date: 2020     Years since quittin.1    Smokeless tobacco: Never    Tobacco comments:     Quit 24   Substance Use Topics    Alcohol use: No    Drug use: No

## 2025-04-20 PROBLEM — R74.01 TRANSAMINITIS: Status: ACTIVE | Noted: 2025-04-20

## 2025-04-20 PROBLEM — E87.6 HYPOKALEMIA: Status: ACTIVE | Noted: 2025-04-20

## 2025-04-20 PROBLEM — C34.12 SMALL CELL LUNG CANCER, LEFT UPPER LOBE: Status: ACTIVE | Noted: 2024-07-16

## 2025-04-20 PROBLEM — E66.9 OBESITY: Status: ACTIVE | Noted: 2024-07-16

## 2025-04-20 PROBLEM — M54.50 LOW BACK PAIN: Status: ACTIVE | Noted: 2025-04-20

## 2025-04-20 PROBLEM — J44.1 COPD EXACERBATION: Status: ACTIVE | Noted: 2024-09-23

## 2025-04-20 PROBLEM — K29.70 GASTRITIS: Status: ACTIVE | Noted: 2025-04-20

## 2025-04-21 PROBLEM — Z74.09 OTHER REDUCED MOBILITY: Status: ACTIVE | Noted: 2025-04-21

## 2025-04-21 PROBLEM — R54 AGE-RELATED PHYSICAL DEBILITY: Status: ACTIVE | Noted: 2024-01-27

## 2025-08-13 PROBLEM — R54 AGE-RELATED PHYSICAL DEBILITY: Status: RESOLVED | Noted: 2024-01-27 | Resolved: 2025-08-13

## 2025-08-13 PROBLEM — Z87.891 FORMER HEAVY CIGARETTE SMOKER (20-39 PER DAY): Status: RESOLVED | Noted: 2024-03-14 | Resolved: 2025-08-13

## 2025-08-13 PROBLEM — I73.9 PERIPHERAL VASCULAR DISEASE, UNSPECIFIED: Status: RESOLVED | Noted: 2024-01-23 | Resolved: 2025-08-13

## 2025-08-13 PROBLEM — E66.01 CLASS 2 SEVERE OBESITY DUE TO EXCESS CALORIES WITH SERIOUS COMORBIDITY AND BODY MASS INDEX (BMI) OF 35.0 TO 35.9 IN ADULT: Status: RESOLVED | Noted: 2019-04-24 | Resolved: 2025-08-13

## 2025-08-13 PROBLEM — M72.2 PLANTAR FASCIITIS: Status: RESOLVED | Noted: 2022-11-11 | Resolved: 2025-08-13

## 2025-08-13 PROBLEM — I50.9 NEW ONSET OF CONGESTIVE HEART FAILURE: Status: RESOLVED | Noted: 2024-01-23 | Resolved: 2025-08-13

## 2025-08-13 PROBLEM — E78.5 HYPERLIPIDEMIA: Status: ACTIVE | Noted: 2022-11-11

## 2025-08-13 PROBLEM — I49.3 PVC (PREMATURE VENTRICULAR CONTRACTION): Status: RESOLVED | Noted: 2024-01-27 | Resolved: 2025-08-13

## 2025-08-13 PROBLEM — Z91.89 FRAMINGHAM CARDIAC RISK 10-20% IN NEXT 10 YEARS: Status: RESOLVED | Noted: 2021-07-16 | Resolved: 2025-08-13

## 2025-08-13 PROBLEM — J96.11 CHRONIC HYPOXEMIC RESPIRATORY FAILURE: Status: RESOLVED | Noted: 2024-01-31 | Resolved: 2025-08-13

## 2025-08-13 PROBLEM — J96.21 ACUTE ON CHRONIC RESPIRATORY FAILURE WITH HYPOXEMIA: Status: ACTIVE | Noted: 2024-01-31

## 2025-08-13 PROBLEM — G93.41 ACUTE METABOLIC ENCEPHALOPATHY: Status: RESOLVED | Noted: 2024-01-28 | Resolved: 2025-08-13

## 2025-08-13 PROBLEM — I10 HYPERTENSION: Status: ACTIVE | Noted: 2025-08-13

## 2025-08-13 PROBLEM — E66.812 CLASS 2 SEVERE OBESITY DUE TO EXCESS CALORIES WITH SERIOUS COMORBIDITY AND BODY MASS INDEX (BMI) OF 35.0 TO 35.9 IN ADULT: Status: RESOLVED | Noted: 2019-04-24 | Resolved: 2025-08-13

## 2025-08-13 PROBLEM — K21.9 GERD (GASTROESOPHAGEAL REFLUX DISEASE): Status: ACTIVE | Noted: 2025-08-13

## 2025-08-13 PROBLEM — J44.1 COPD EXACERBATION: Status: RESOLVED | Noted: 2024-09-23 | Resolved: 2025-08-13

## 2025-08-14 PROBLEM — R06.02 SOB (SHORTNESS OF BREATH): Status: ACTIVE | Noted: 2025-08-14

## 2025-08-14 PROBLEM — R79.89 ELEVATED TROPONIN: Status: ACTIVE | Noted: 2025-08-14

## 2025-08-24 ENCOUNTER — PATIENT MESSAGE (OUTPATIENT)
Dept: CARDIOLOGY | Facility: CLINIC | Age: 80
End: 2025-08-24
Payer: MEDICARE

## 2025-09-04 ENCOUNTER — OFFICE VISIT (OUTPATIENT)
Dept: CARDIOLOGY | Facility: CLINIC | Age: 80
End: 2025-09-04
Payer: MEDICARE

## 2025-09-04 VITALS
WEIGHT: 177.69 LBS | SYSTOLIC BLOOD PRESSURE: 132 MMHG | HEIGHT: 62 IN | HEART RATE: 78 BPM | DIASTOLIC BLOOD PRESSURE: 80 MMHG | BODY MASS INDEX: 32.7 KG/M2

## 2025-09-04 DIAGNOSIS — Z95.5 S/P DRUG ELUTING CORONARY STENT PLACEMENT: ICD-10-CM

## 2025-09-04 DIAGNOSIS — R91.8 MASS OF UPPER LOBE OF LEFT LUNG: Primary | ICD-10-CM

## 2025-09-04 DIAGNOSIS — I25.10 CORONARY ARTERY DISEASE, UNSPECIFIED VESSEL OR LESION TYPE, UNSPECIFIED WHETHER ANGINA PRESENT, UNSPECIFIED WHETHER NATIVE OR TRANSPLANTED HEART: ICD-10-CM

## 2025-09-04 PROCEDURE — 99999 PR PBB SHADOW E&M-EST. PATIENT-LVL III: CPT | Mod: PBBFAC,,, | Performed by: INTERNAL MEDICINE

## 2025-09-04 PROCEDURE — 99214 OFFICE O/P EST MOD 30 MIN: CPT | Mod: S$GLB,,, | Performed by: INTERNAL MEDICINE

## 2025-09-04 PROCEDURE — 1111F DSCHRG MED/CURRENT MED MERGE: CPT | Mod: CPTII,S$GLB,, | Performed by: INTERNAL MEDICINE

## 2025-09-04 PROCEDURE — 1126F AMNT PAIN NOTED NONE PRSNT: CPT | Mod: CPTII,S$GLB,, | Performed by: INTERNAL MEDICINE

## 2025-09-04 PROCEDURE — 3288F FALL RISK ASSESSMENT DOCD: CPT | Mod: CPTII,S$GLB,, | Performed by: INTERNAL MEDICINE

## 2025-09-04 PROCEDURE — 1101F PT FALLS ASSESS-DOCD LE1/YR: CPT | Mod: CPTII,S$GLB,, | Performed by: INTERNAL MEDICINE

## 2025-09-04 PROCEDURE — 3075F SYST BP GE 130 - 139MM HG: CPT | Mod: CPTII,S$GLB,, | Performed by: INTERNAL MEDICINE

## 2025-09-04 PROCEDURE — 3079F DIAST BP 80-89 MM HG: CPT | Mod: CPTII,S$GLB,, | Performed by: INTERNAL MEDICINE

## 2025-09-04 PROCEDURE — 1159F MED LIST DOCD IN RCRD: CPT | Mod: CPTII,S$GLB,, | Performed by: INTERNAL MEDICINE

## 2025-09-05 DIAGNOSIS — E78.2 MIXED HYPERLIPIDEMIA: ICD-10-CM

## 2025-09-06 RX ORDER — ATORVASTATIN CALCIUM 40 MG/1
40 TABLET, FILM COATED ORAL DAILY
Qty: 30 TABLET | Refills: 0 | Status: SHIPPED | OUTPATIENT
Start: 2025-09-06